# Patient Record
Sex: MALE | Race: NATIVE HAWAIIAN OR OTHER PACIFIC ISLANDER | Employment: FULL TIME | URBAN - METROPOLITAN AREA
[De-identification: names, ages, dates, MRNs, and addresses within clinical notes are randomized per-mention and may not be internally consistent; named-entity substitution may affect disease eponyms.]

---

## 2017-08-28 RX ORDER — LOSARTAN POTASSIUM AND HYDROCHLOROTHIAZIDE 12.5; 5 MG/1; MG/1
1 TABLET ORAL
Qty: 90 TABLET | Refills: 3 | Status: SHIPPED | OUTPATIENT
Start: 2017-08-28 | End: 2018-08-21

## 2017-12-17 ENCOUNTER — OFFICE VISIT (OUTPATIENT)
Dept: FAMILY MEDICINE CLINIC | Facility: CLINIC | Age: 52
End: 2017-12-17

## 2017-12-17 VITALS
TEMPERATURE: 99 F | SYSTOLIC BLOOD PRESSURE: 136 MMHG | OXYGEN SATURATION: 97 % | WEIGHT: 181 LBS | RESPIRATION RATE: 16 BRPM | DIASTOLIC BLOOD PRESSURE: 86 MMHG | HEART RATE: 82 BPM | BODY MASS INDEX: 30 KG/M2

## 2017-12-17 DIAGNOSIS — J20.9 ACUTE BRONCHITIS WITH BRONCHOSPASM: Primary | ICD-10-CM

## 2017-12-17 PROCEDURE — 99213 OFFICE O/P EST LOW 20 MIN: CPT | Performed by: PHYSICIAN ASSISTANT

## 2017-12-17 RX ORDER — AZITHROMYCIN 250 MG/1
TABLET, FILM COATED ORAL
Qty: 6 TABLET | Refills: 0 | Status: SHIPPED | OUTPATIENT
Start: 2017-12-17 | End: 2018-05-15 | Stop reason: ALTCHOICE

## 2017-12-17 RX ORDER — ALBUTEROL SULFATE 90 UG/1
2 AEROSOL, METERED RESPIRATORY (INHALATION) EVERY 4 HOURS PRN
Qty: 1 INHALER | Refills: 0 | Status: SHIPPED | OUTPATIENT
Start: 2017-12-17 | End: 2018-01-16 | Stop reason: WASHOUT

## 2017-12-17 RX ORDER — METHYLPREDNISOLONE 4 MG/1
TABLET ORAL
Qty: 1 PACKAGE | Refills: 0 | Status: SHIPPED | OUTPATIENT
Start: 2017-12-17 | End: 2018-05-15 | Stop reason: ALTCHOICE

## 2017-12-17 NOTE — PROGRESS NOTES
CHIEF COMPLAINT:   Patient presents with:  Cough: wheezing x 4 days, using inhaler    HPI:   Smith Carvajal is a 50year old male who presents for upper and lower respiratory symptoms for  1 weeks, intermittent wheezing x4 days.  Patient reports congestion, cou Smoking status: Current Some Day Smoker                                                    Packs/day: 0.00      Years: 0.00         Types: Cigarettes  Smokeless tobacco: Never Used                      Comment: socially  Alcohol use:  Yes              Comme azithromycin (ZITHROMAX Z-KAYA) 250 MG Oral Tab 6 tablet 0      Sig: Take two tablets by mouth today, then one tablet daily.       Albuterol Sulfate HFA (PROAIR HFA) 108 (90 Base) MCG/ACT Inhalation Aero Soln 1 Inhaler 0      Sig: Inhale 2 puffs into the andrey · You may use over-the-counter medicine to control fever or pain, unless another medicine was prescribed.  Note: If you have chronic liver or kidney disease or have ever had a stomach ulcer or gastrointestinal bleeding, talk with your healthcare provider be · Coughing up increasing amounts of colored sputum  · Weakness, drowsiness, headache, facial pain, ear pain, or a stiff neck  Call 911  Call 911 if any of these occur.   · Coughing up blood  · Worsening weakness, drowsiness, headache, or stiff neck  · Incre

## 2018-04-27 ENCOUNTER — OFFICE VISIT (OUTPATIENT)
Dept: FAMILY MEDICINE CLINIC | Facility: CLINIC | Age: 53
End: 2018-04-27

## 2018-04-27 VITALS
HEIGHT: 65.5 IN | HEART RATE: 92 BPM | DIASTOLIC BLOOD PRESSURE: 74 MMHG | WEIGHT: 182 LBS | BODY MASS INDEX: 29.96 KG/M2 | TEMPERATURE: 99 F | RESPIRATION RATE: 16 BRPM | OXYGEN SATURATION: 98 % | SYSTOLIC BLOOD PRESSURE: 126 MMHG

## 2018-04-27 DIAGNOSIS — J20.9 ACUTE BRONCHITIS, UNSPECIFIED ORGANISM: Primary | ICD-10-CM

## 2018-04-27 PROCEDURE — 99213 OFFICE O/P EST LOW 20 MIN: CPT | Performed by: PHYSICIAN ASSISTANT

## 2018-04-27 RX ORDER — AZITHROMYCIN 250 MG/1
TABLET, FILM COATED ORAL
Qty: 6 TABLET | Refills: 0 | Status: SHIPPED | OUTPATIENT
Start: 2018-04-27 | End: 2018-05-15 | Stop reason: ALTCHOICE

## 2018-04-27 RX ORDER — BENZONATATE 200 MG/1
200 CAPSULE ORAL EVERY 8 HOURS PRN
Qty: 21 CAPSULE | Refills: 0 | Status: SHIPPED | OUTPATIENT
Start: 2018-04-27 | End: 2018-05-04

## 2018-04-27 RX ORDER — ALBUTEROL SULFATE 90 UG/1
AEROSOL, METERED RESPIRATORY (INHALATION)
Qty: 1 INHALER | Refills: 0 | Status: SHIPPED | OUTPATIENT
Start: 2018-04-27 | End: 2018-05-15 | Stop reason: ALTCHOICE

## 2018-04-27 RX ORDER — METHYLPREDNISOLONE 4 MG/1
TABLET ORAL
Qty: 1 PACKAGE | Refills: 0 | Status: SHIPPED | OUTPATIENT
Start: 2018-04-27 | End: 2018-05-15 | Stop reason: ALTCHOICE

## 2018-04-27 NOTE — PROGRESS NOTES
CHIEF COMPLAINT:   Patient presents with:  Cough: wheezy last 3 days, worse today, chest tightness, lots of mucus,sneezing x 2 weeks  hx of allergies        HPI:   Michael Busch is a 48year old male who presents for cough for  3  days. Cough is productive. Past Medical History:   Diagnosis Date   • Acute upper respiratory infections of unspecified site    • Calculus of gallbladder without mention of cholecystitis or obstruction    • Chronic rhinitis    • Extrinsic asthma, unspecified     ALLERGY INDUCED ASTH Meds & Refills for this Visit:  Signed Prescriptions Disp Refills    azithromycin (ZITHROMAX Z-KAYA) 250 MG Oral Tab 6 tablet 0      Sig: Take two tablets by mouth today, then one tablet daily.       methylPREDNISolone 4 MG Oral Tablet Therapy Pack 1 Package · You may use over-the-counter medicines to control fever or pain, unless another medicine was prescribed.  If you have chronic liver or kidney disease or have ever had a stomach ulcer or gastrointestinal bleeding, talk with your healthcare provider before · Trouble breathing, wheezing, or pain with breathing  Date Last Reviewed: 9/13/2015  © 8132-4639 [employer]. [address] [facility] [place], [address] [place] [address]. All rights reserved.  This information is not intended as a substitute for professional m

## 2018-04-27 NOTE — PATIENT INSTRUCTIONS
1. Z saroj  2. Medrol- No NSAIDs  3. Albuterol  4. Tessalon  5. Follow up with PCP  Bronchitis, Antibiotic Treatment (Adult)    Bronchitis is an infection of the air passages (bronchial tubes) in your lungs. It often occurs when you have a cold.  This illne · Finish all antibiotic medicine. Do this even if you are feeling better after only a few days. Follow-up care  Follow up with your healthcare provider, or as advised. If you had an X-ray or ECG (electrocardiogram), a specialist will review it.  You will b

## 2018-05-15 ENCOUNTER — OFFICE VISIT (OUTPATIENT)
Dept: FAMILY MEDICINE CLINIC | Facility: CLINIC | Age: 53
End: 2018-05-15

## 2018-05-15 VITALS
TEMPERATURE: 99 F | SYSTOLIC BLOOD PRESSURE: 138 MMHG | HEIGHT: 65.5 IN | RESPIRATION RATE: 18 BRPM | WEIGHT: 183 LBS | OXYGEN SATURATION: 98 % | BODY MASS INDEX: 30.12 KG/M2 | HEART RATE: 89 BPM | DIASTOLIC BLOOD PRESSURE: 86 MMHG

## 2018-05-15 DIAGNOSIS — J30.1 NON-SEASONAL ALLERGIC RHINITIS DUE TO POLLEN: Primary | ICD-10-CM

## 2018-05-15 DIAGNOSIS — J45.20 INTERMITTENT ASTHMA WITHOUT COMPLICATION, UNSPECIFIED ASTHMA SEVERITY: ICD-10-CM

## 2018-05-15 PROCEDURE — 99213 OFFICE O/P EST LOW 20 MIN: CPT | Performed by: FAMILY MEDICINE

## 2018-05-15 RX ORDER — MONTELUKAST SODIUM 10 MG/1
10 TABLET ORAL DAILY
Qty: 90 TABLET | Refills: 3 | Status: SHIPPED | OUTPATIENT
Start: 2018-05-15 | End: 2019-03-22

## 2018-05-15 RX ORDER — MONTELUKAST SODIUM 10 MG/1
10 TABLET ORAL DAILY
Qty: 30 TABLET | Refills: 0 | Status: SHIPPED | OUTPATIENT
Start: 2018-05-15 | End: 2018-06-14

## 2018-05-15 NOTE — PROGRESS NOTES
Was in the urgent care a few weeks ago with acute bronchitis. Prescribed with antibiotics and inhaler. Doing much better. Needs his Singulair refilled. Does have underlying allergies. Family history is riddled with allergies.     Denies fevers or chill with Dr. Fair Last in August to follow-up on his blood pressure.

## 2018-06-15 ENCOUNTER — PATIENT MESSAGE (OUTPATIENT)
Dept: FAMILY MEDICINE CLINIC | Facility: CLINIC | Age: 53
End: 2018-06-15

## 2018-06-18 RX ORDER — ALBUTEROL SULFATE 90 UG/1
AEROSOL, METERED RESPIRATORY (INHALATION)
Qty: 1 INHALER | Refills: 0 | Status: SHIPPED | OUTPATIENT
Start: 2018-06-18 | End: 2019-02-27

## 2018-06-18 NOTE — TELEPHONE ENCOUNTER
From: Yu Curry  To: Ekaterina Mancuso DO  Sent: 6/15/2018 6:30 PM CDT  Subject: Prescription Question    I am requesting a refill for my Rescue Inhaler (Abuterol Sulfate). I have seemed to misplace my last one.     Thank you

## 2018-06-24 ENCOUNTER — OFFICE VISIT (OUTPATIENT)
Dept: FAMILY MEDICINE CLINIC | Facility: CLINIC | Age: 53
End: 2018-06-24

## 2018-06-24 VITALS
TEMPERATURE: 100 F | DIASTOLIC BLOOD PRESSURE: 80 MMHG | BODY MASS INDEX: 29.46 KG/M2 | HEIGHT: 65.5 IN | OXYGEN SATURATION: 98 % | HEART RATE: 108 BPM | WEIGHT: 179 LBS | RESPIRATION RATE: 16 BRPM | SYSTOLIC BLOOD PRESSURE: 120 MMHG

## 2018-06-24 DIAGNOSIS — J30.9 ALLERGIC RHINITIS, UNSPECIFIED SEASONALITY, UNSPECIFIED TRIGGER: ICD-10-CM

## 2018-06-24 DIAGNOSIS — J45.901 MODERATE ASTHMA WITH ACUTE EXACERBATION, UNSPECIFIED WHETHER PERSISTENT: Primary | ICD-10-CM

## 2018-06-24 PROCEDURE — 99213 OFFICE O/P EST LOW 20 MIN: CPT | Performed by: NURSE PRACTITIONER

## 2018-06-24 RX ORDER — FLUTICASONE PROPIONATE 50 MCG
2 SPRAY, SUSPENSION (ML) NASAL DAILY
Qty: 1 BOTTLE | Refills: 3 | Status: SHIPPED | OUTPATIENT
Start: 2018-06-24 | End: 2019-06-19

## 2018-06-24 RX ORDER — AMOXICILLIN AND CLAVULANATE POTASSIUM 875; 125 MG/1; MG/1
1 TABLET, FILM COATED ORAL 2 TIMES DAILY
Qty: 20 TABLET | Refills: 0 | Status: SHIPPED | OUTPATIENT
Start: 2018-06-24 | End: 2018-07-04

## 2018-06-24 RX ORDER — PREDNISONE 20 MG/1
20 TABLET ORAL DAILY
Qty: 5 TABLET | Refills: 0 | Status: SHIPPED | OUTPATIENT
Start: 2018-06-24 | End: 2018-06-29

## 2018-06-24 NOTE — PROGRESS NOTES
CHIEF COMPLAINT:   Patient presents with:  Cough: x3 weeks, wheezing  Body ache and/or chills      HPI:   Alosno Cutler is a 48year old male who presents for upper respiratory symptoms for  3 weeks.  Patient reports congestion, dry cough, wheezing, body aches Smokeless tobacco: Never Used                      Comment: socially  Alcohol use:  Yes              Comment: 3 TIMES PER YEAR        REVIEW OF SYSTEMS:   GENERAL: good appetite  SKIN: no rashes or abnormal skin lesions  HEENT: See HPI  LUNGS: See HPI  CARD Risks, benefits, and side effects of medication explained and discussed. The patient indicates understanding of these issues and agrees to the plan. The patient is asked to f/u with PCP if sx's persist or worsen.   Patient Instructions     Patient advis Allergies can cause nasal tissue to swell. This makes the air passages smaller. The nose may feel stuffed up. The nose may also make extra mucus, which can plug the nasal passages or drip out of the nose.  Mucus can drip down the back of the throat (postnas

## 2018-06-24 NOTE — PATIENT INSTRUCTIONS
Patient advised to start antihistamine (Claritin/Zyrtec/Allegra) daily along with nasal steroid spray daily for one month and then stop. IF the sinus congestion returns to restart the allergy medications for the entire growing season.    Use the prednisone · Plugged-up ears (ear congestion)  · Sore throat  · Coughing  · Sinus pain and swelling  · Headache  It may not be allergies  Other health problems can cause symptoms like those of nasal allergies.  These include:  · Nonallergic rhinitis and viruses such a

## 2018-08-03 ENCOUNTER — OFFICE VISIT (OUTPATIENT)
Dept: FAMILY MEDICINE CLINIC | Facility: CLINIC | Age: 53
End: 2018-08-03
Payer: COMMERCIAL

## 2018-08-03 VITALS
TEMPERATURE: 98 F | OXYGEN SATURATION: 98 % | BODY MASS INDEX: 29.09 KG/M2 | WEIGHT: 181 LBS | DIASTOLIC BLOOD PRESSURE: 80 MMHG | RESPIRATION RATE: 18 BRPM | HEIGHT: 66 IN | SYSTOLIC BLOOD PRESSURE: 120 MMHG | HEART RATE: 84 BPM

## 2018-08-03 DIAGNOSIS — J45.901 ASTHMATIC BRONCHITIS WITH ACUTE EXACERBATION, UNSPECIFIED ASTHMA SEVERITY, UNSPECIFIED WHETHER PERSISTENT: Primary | ICD-10-CM

## 2018-08-03 PROCEDURE — 94640 AIRWAY INHALATION TREATMENT: CPT | Performed by: PHYSICIAN ASSISTANT

## 2018-08-03 PROCEDURE — 99213 OFFICE O/P EST LOW 20 MIN: CPT | Performed by: PHYSICIAN ASSISTANT

## 2018-08-03 RX ORDER — ALBUTEROL SULFATE 2.5 MG/3ML
2.5 SOLUTION RESPIRATORY (INHALATION) ONCE
Status: COMPLETED | OUTPATIENT
Start: 2018-08-03 | End: 2018-08-03

## 2018-08-03 RX ORDER — METHYLPREDNISOLONE 4 MG/1
TABLET ORAL
Qty: 1 PACKAGE | Refills: 0 | Status: SHIPPED | OUTPATIENT
Start: 2018-08-03 | End: 2018-08-21 | Stop reason: ALTCHOICE

## 2018-08-03 RX ORDER — AZITHROMYCIN 250 MG/1
TABLET, FILM COATED ORAL
Qty: 6 TABLET | Refills: 0 | Status: SHIPPED | OUTPATIENT
Start: 2018-08-03 | End: 2018-08-21 | Stop reason: ALTCHOICE

## 2018-08-03 RX ADMIN — ALBUTEROL SULFATE 2.5 MG: 2.5 SOLUTION RESPIRATORY (INHALATION) at 19:25:00

## 2018-08-03 NOTE — PROGRESS NOTES
CHIEF COMPLAINT:   Patient presents with:  Cough: Producive,wheezing. 3 days. HPI:   Yury Norton is a 48year old male who presents for cough for 3 days. Cough is productive.  Associated symptoms include: chest tightness, SOB, wheezing, post nasal dri Smoking status: Current Some Day Smoker                                                    Packs/day: 0.00      Years: 0.00         Types: Cigarettes  Smokeless tobacco: Never Used                      Comment: socially  Alcohol use:  Yes              Comme methylPREDNISolone 4 MG Oral Tablet Therapy Pack 1 Package 0      Sig: Take as directed on package instructions      azithromycin (ZITHROMAX Z-KAYA) 250 MG Oral Tab 6 tablet 0      Sig: Take two tablets by mouth today, then one tablet daily.            Side A physical exam, health history, and certain tests help your healthcare provider make the diagnosis. Health history  Your healthcare provider will ask you about your symptoms. The exam  Your provider listens Linard Heading chest for signs of congestion.  He or s Most cases of bronchitis are caused by cold or flu viruses. They don’t need antibiotics to treat them, even if your mucus is thick and green or yellow.  Antibiotics don’t treat viral illness and antibiotics have not been shown to have any benefit in cases o © 5070-7822 [employer]. [address] [facility] [place], [address] [place]. All rights reserved. This information is not intended as a substitute for professional medical care. Always follow your healthcare professional's instructions.             The

## 2018-08-03 NOTE — PATIENT INSTRUCTIONS
1. Medrol- No NSAIDs  2. Albuterol  3. Continue allergy medications  4. If fever start Z saroj  5. Follow up with PCP  6. Seek treatment if worsening symptoms     What Is Acute Bronchitis?   Acute bronchitis is when the airways in your lungs (bronchial tube · A sputum test for bacteria. This requires a sample of mucus from your lungs. · A nasal or throat swab. This tests to see if you have a bacterial infection. · A chest X-ray. This is done if your healthcare provider thinks you have pneumonia.   · Tests to · Take all of the medicine. Take the medicine until it is used up, even if symptoms have improved. If you don’t, the bronchitis may come back. · Take the medicines as directed. For instance, some medicines should be taken with food.   · Ask about side effe

## 2018-08-21 ENCOUNTER — OFFICE VISIT (OUTPATIENT)
Dept: FAMILY MEDICINE CLINIC | Facility: CLINIC | Age: 53
End: 2018-08-21
Payer: COMMERCIAL

## 2018-08-21 VITALS
DIASTOLIC BLOOD PRESSURE: 86 MMHG | WEIGHT: 184 LBS | HEIGHT: 66 IN | TEMPERATURE: 99 F | OXYGEN SATURATION: 98 % | BODY MASS INDEX: 29.57 KG/M2 | RESPIRATION RATE: 16 BRPM | SYSTOLIC BLOOD PRESSURE: 124 MMHG | HEART RATE: 76 BPM

## 2018-08-21 DIAGNOSIS — I10 ESSENTIAL HYPERTENSION: ICD-10-CM

## 2018-08-21 DIAGNOSIS — Z00.00 ROUTINE GENERAL MEDICAL EXAMINATION AT A HEALTH CARE FACILITY: ICD-10-CM

## 2018-08-21 DIAGNOSIS — E55.9 HYPOVITAMINOSIS D: ICD-10-CM

## 2018-08-21 DIAGNOSIS — Z23 NEED FOR PNEUMOCOCCAL VACCINATION: ICD-10-CM

## 2018-08-21 DIAGNOSIS — Z00.00 LABORATORY EXAMINATION ORDERED AS PART OF A ROUTINE GENERAL MEDICAL EXAMINATION: Primary | ICD-10-CM

## 2018-08-21 DIAGNOSIS — K75.81 STEATOHEPATITIS, NON-ALCOHOLIC: ICD-10-CM

## 2018-08-21 DIAGNOSIS — Z11.59 ENCOUNTER FOR HEPATITIS C SCREENING TEST FOR LOW RISK PATIENT: ICD-10-CM

## 2018-08-21 DIAGNOSIS — Z12.11 SCREENING FOR MALIGNANT NEOPLASM OF COLON: ICD-10-CM

## 2018-08-21 DIAGNOSIS — E78.00 PURE HYPERCHOLESTEROLEMIA: ICD-10-CM

## 2018-08-21 DIAGNOSIS — Z12.5 ENCOUNTER FOR SCREENING FOR MALIGNANT NEOPLASM OF PROSTATE: ICD-10-CM

## 2018-08-21 PROCEDURE — 90670 PCV13 VACCINE IM: CPT | Performed by: FAMILY MEDICINE

## 2018-08-21 PROCEDURE — 99213 OFFICE O/P EST LOW 20 MIN: CPT | Performed by: FAMILY MEDICINE

## 2018-08-21 PROCEDURE — 90471 IMMUNIZATION ADMIN: CPT | Performed by: FAMILY MEDICINE

## 2018-08-21 RX ORDER — LOSARTAN POTASSIUM AND HYDROCHLOROTHIAZIDE 12.5; 5 MG/1; MG/1
1 TABLET ORAL
Qty: 90 TABLET | Refills: 3 | Status: SHIPPED | OUTPATIENT
Start: 2018-08-21 | End: 2019-08-10

## 2018-08-21 NOTE — PROGRESS NOTES
Patient presents for a physical.  He is a known diabetic. All medications were reviewed these include, in addition to his inhalers, losartan for blood pressure. He is due for a Prevnar vaccine which will be given today.     Exam vital signs normal

## 2018-08-21 NOTE — PROGRESS NOTES
My suggestion to discuss his recent blood work. Though he is essentially asymptomatic his vitamin D level was low and he is subsequently taking 2000 units of D3 supplement per day.     His fasting blood sugar returned at 122 without symptoms of polyuri

## 2018-08-30 LAB
ABSOLUTE BASOPHILS: 72 CELLS/UL (ref 0–200)
ABSOLUTE EOSINOPHILS: 302 CELLS/UL (ref 15–500)
ABSOLUTE LYMPHOCYTES: 2707 CELLS/UL (ref 850–3900)
ABSOLUTE MONOCYTES: 454 CELLS/UL (ref 200–950)
ABSOLUTE NEUTROPHILS: 3665 CELLS/UL (ref 1500–7800)
ALBUMIN/GLOBULIN RATIO: 1.2 (CALC) (ref 1–2.5)
ALBUMIN: 4 G/DL (ref 3.6–5.1)
ALKALINE PHOSPHATASE: 75 U/L (ref 40–115)
ALT: 31 U/L (ref 9–46)
AST: 17 U/L (ref 10–35)
BASOPHILS: 1 %
BILIRUBIN, TOTAL: 0.4 MG/DL (ref 0.2–1.2)
BUN: 20 MG/DL (ref 7–25)
CALCIUM: 9.4 MG/DL (ref 8.6–10.3)
CARBON DIOXIDE: 27 MMOL/L (ref 20–32)
CHLORIDE: 104 MMOL/L (ref 98–110)
CHOL/HDLC RATIO: 7.4 (CALC)
CHOLESTEROL, TOTAL: 223 MG/DL
CREATININE: 0.92 MG/DL (ref 0.7–1.33)
EGFR IF AFRICN AM: 111 ML/MIN/1.73M2
EGFR IF NONAFRICN AM: 96 ML/MIN/1.73M2
EOSINOPHILS: 4.2 %
GLOBULIN: 3.3 G/DL (CALC) (ref 1.9–3.7)
GLUCOSE: 91 MG/DL (ref 65–99)
HDL CHOLESTEROL: 30 MG/DL
HEMATOCRIT: 44.8 % (ref 38.5–50)
HEMOGLOBIN: 15.7 G/DL (ref 13.2–17.1)
LDL-CHOLESTEROL: 155 MG/DL (CALC)
LYMPHOCYTES: 37.6 %
MCH: 31 PG (ref 27–33)
MCHC: 35 G/DL (ref 32–36)
MCV: 88.5 FL (ref 80–100)
MONOCYTES: 6.3 %
MPV: 9.6 FL (ref 7.5–12.5)
NEUTROPHILS: 50.9 %
NON-HDL CHOLESTEROL: 193 MG/DL (CALC)
PLATELET COUNT: 362 THOUSAND/UL (ref 140–400)
POTASSIUM: 4.2 MMOL/L (ref 3.5–5.3)
PROTEIN, TOTAL: 7.3 G/DL (ref 6.1–8.1)
RDW: 12.6 % (ref 11–15)
RED BLOOD CELL COUNT: 5.06 MILLION/UL (ref 4.2–5.8)
SIGNAL TO CUT-OFF: 0.02
SODIUM: 138 MMOL/L (ref 135–146)
TRIGLYCERIDES: 224 MG/DL
TSH: 2.61 MIU/L (ref 0.4–4.5)
VITAMIN D, 25-OH, TOTAL: 20 NG/ML (ref 30–100)
WHITE BLOOD CELL COUNT: 7.2 THOUSAND/UL (ref 3.8–10.8)

## 2018-10-23 ENCOUNTER — IMMUNIZATION (OUTPATIENT)
Dept: FAMILY MEDICINE CLINIC | Facility: CLINIC | Age: 53
End: 2018-10-23
Payer: COMMERCIAL

## 2018-10-23 DIAGNOSIS — Z23 NEED FOR VACCINATION: ICD-10-CM

## 2018-10-23 PROCEDURE — 90686 IIV4 VACC NO PRSV 0.5 ML IM: CPT | Performed by: FAMILY MEDICINE

## 2018-10-23 PROCEDURE — 90471 IMMUNIZATION ADMIN: CPT | Performed by: FAMILY MEDICINE

## 2019-02-27 RX ORDER — ALBUTEROL SULFATE 90 UG/1
AEROSOL, METERED RESPIRATORY (INHALATION)
Qty: 1 INHALER | Refills: 0 | Status: SHIPPED | OUTPATIENT
Start: 2019-02-27 | End: 2019-07-13

## 2019-03-22 DIAGNOSIS — J45.20 INTERMITTENT ASTHMA WITHOUT COMPLICATION, UNSPECIFIED ASTHMA SEVERITY: ICD-10-CM

## 2019-03-22 DIAGNOSIS — J30.1 NON-SEASONAL ALLERGIC RHINITIS DUE TO POLLEN: ICD-10-CM

## 2019-03-23 RX ORDER — MONTELUKAST SODIUM 10 MG/1
TABLET ORAL
Qty: 90 TABLET | Refills: 3 | Status: SHIPPED | OUTPATIENT
Start: 2019-03-23 | End: 2020-01-28

## 2019-05-16 ENCOUNTER — OFFICE VISIT (OUTPATIENT)
Dept: FAMILY MEDICINE CLINIC | Facility: CLINIC | Age: 54
End: 2019-05-16
Payer: COMMERCIAL

## 2019-05-16 VITALS
HEIGHT: 66 IN | WEIGHT: 183 LBS | HEART RATE: 70 BPM | OXYGEN SATURATION: 98 % | BODY MASS INDEX: 29.41 KG/M2 | DIASTOLIC BLOOD PRESSURE: 86 MMHG | TEMPERATURE: 98 F | RESPIRATION RATE: 16 BRPM | SYSTOLIC BLOOD PRESSURE: 132 MMHG

## 2019-05-16 DIAGNOSIS — H00.014 HORDEOLUM EXTERNUM OF LEFT UPPER EYELID: Primary | ICD-10-CM

## 2019-05-16 PROCEDURE — 99213 OFFICE O/P EST LOW 20 MIN: CPT | Performed by: PHYSICIAN ASSISTANT

## 2019-05-16 RX ORDER — ERYTHROMYCIN 5 MG/G
OINTMENT OPHTHALMIC
Qty: 3.5 G | Refills: 0 | Status: SHIPPED | OUTPATIENT
Start: 2019-05-16 | End: 2019-09-04 | Stop reason: ALTCHOICE

## 2019-05-16 NOTE — PATIENT INSTRUCTIONS
1. Erythromycin  2. Continue warm compresses  3. Follow up with ophthalmology if no improvement  4. No contacts until symptoms resolve    Sty (or Stye)  A sty is an infection of the oil gland of the eyelid.  It may develop into a small pocket of pus (an a · The sty comes back  Date Last Reviewed: 8/1/2017  © 4381-4209 [employer]. [address] [facility] [place], [facility] [place]. All rights reserved. This information is not intended as a substitute for professional medical care.  Always follow your he

## 2019-05-16 NOTE — PROGRESS NOTES
CHIEF COMPLAINT:   Patient presents with:  Eye Problem: upper lid swelling, clear tearing last few days, had stye a few weeks ago      HPI:   Marianne Awan is a 48year old male who presents with chief complaint of left upper eye lid swelling for two weeks. • Hx of endoscopy 08/17/2017   • Lipid screening 12/1/11      Past Surgical History:   Procedure Laterality Date   • ANESTH,ACHILLES TENDON SURG  1/1/01    right   • CHOLECYSTECTOMY        Family History   Problem Relation Age of Onset   • Allergies Father PLAN: Hygeine and comfort care as listen in patient instructions.   Medication as listed below     Requested Prescriptions     Signed Prescriptions Disp Refills   • erythromycin 5 MG/GM Ophthalmic Ointment 3.5 g 0     Sig: Apply thin layer the the left eye · Don’t squeeze or try to break open the sty.   Follow-up care  Follow up with your healthcare provider, or as advised.   When to seek medical advice  Call your healthcare provider right away if any of these occur:  · Increase in swelling or redness around

## 2019-07-15 RX ORDER — ALBUTEROL SULFATE 90 UG/1
AEROSOL, METERED RESPIRATORY (INHALATION)
Qty: 1 INHALER | Refills: 0 | Status: SHIPPED | OUTPATIENT
Start: 2019-07-15 | End: 2019-12-27

## 2019-08-12 RX ORDER — LOSARTAN POTASSIUM AND HYDROCHLOROTHIAZIDE 12.5; 5 MG/1; MG/1
1 TABLET ORAL
Qty: 30 TABLET | Refills: 0 | Status: SHIPPED | OUTPATIENT
Start: 2019-08-12 | End: 2019-09-04

## 2019-08-30 RX ORDER — LOSARTAN POTASSIUM AND HYDROCHLOROTHIAZIDE 12.5; 5 MG/1; MG/1
1 TABLET ORAL
Qty: 90 TABLET | Refills: 3 | Status: SHIPPED | OUTPATIENT
Start: 2019-08-30 | End: 2019-09-04 | Stop reason: ALTCHOICE

## 2019-09-04 ENCOUNTER — OFFICE VISIT (OUTPATIENT)
Dept: FAMILY MEDICINE CLINIC | Facility: CLINIC | Age: 54
End: 2019-09-04
Payer: COMMERCIAL

## 2019-09-04 VITALS
WEIGHT: 184 LBS | HEART RATE: 76 BPM | BODY MASS INDEX: 29.57 KG/M2 | OXYGEN SATURATION: 98 % | HEIGHT: 66 IN | DIASTOLIC BLOOD PRESSURE: 88 MMHG | SYSTOLIC BLOOD PRESSURE: 130 MMHG | TEMPERATURE: 99 F | RESPIRATION RATE: 18 BRPM

## 2019-09-04 DIAGNOSIS — E78.00 PURE HYPERCHOLESTEROLEMIA: ICD-10-CM

## 2019-09-04 DIAGNOSIS — Z00.00 ROUTINE GENERAL MEDICAL EXAMINATION AT A HEALTH CARE FACILITY: Primary | ICD-10-CM

## 2019-09-04 DIAGNOSIS — I10 ESSENTIAL HYPERTENSION: ICD-10-CM

## 2019-09-04 DIAGNOSIS — Z13.1 SCREENING FOR DIABETES MELLITUS: ICD-10-CM

## 2019-09-04 PROCEDURE — 99396 PREV VISIT EST AGE 40-64: CPT | Performed by: FAMILY MEDICINE

## 2019-09-04 RX ORDER — LOSARTAN POTASSIUM AND HYDROCHLOROTHIAZIDE 12.5; 5 MG/1; MG/1
1 TABLET ORAL
Qty: 90 TABLET | Refills: 3 | COMMUNITY
Start: 2019-08-30 | End: 2019-09-11

## 2019-09-04 NOTE — PROGRESS NOTES
HPI:  Here for a physical.    PAST MEDICAL HISTORY:  Past Medical History:   Diagnosis Date   • Acute upper respiratory infections of unspecified site    • Calculus of gallbladder without mention of cholecystitis or obstruction    • Chronic rhinitis    • E acuity. CARDIOVASCULAR: No complaints of chest pain with exertion, no PND, orthopnea, or edema. No decrease in exercise tolerance. PULMONARY: No complaints of extreme shortness of breath with activity. No complaints of  wheezing.  No complaints of  hemopt auscultated. Bilateral dorsalis pedis and posterior tibial pulses 2+/4+. No edema of the bilateral lower extremities. No JVD noted. PULMONARY: CTA bilaterally, good air exchange, no rhonchi, wheezes, or rales. No dullness to percussion.   ABDOMEN: Soft, no fasting labs. Patient understood above plan and agreed to do labs within the next 30 days as well as to make all appointments for referrals if given within the next 30 days. Patient understands to contact office if unable to do so.

## 2019-09-06 ENCOUNTER — APPOINTMENT (OUTPATIENT)
Dept: LAB | Age: 54
End: 2019-09-06
Attending: FAMILY MEDICINE
Payer: COMMERCIAL

## 2019-09-06 DIAGNOSIS — Z00.00 ROUTINE GENERAL MEDICAL EXAMINATION AT A HEALTH CARE FACILITY: ICD-10-CM

## 2019-09-06 LAB
ALBUMIN SERPL-MCNC: 3.5 G/DL (ref 3.4–5)
ALBUMIN/GLOB SERPL: 0.9 {RATIO} (ref 1–2)
ALP LIVER SERPL-CCNC: 74 U/L (ref 45–117)
ALT SERPL-CCNC: 56 U/L (ref 16–61)
ANION GAP SERPL CALC-SCNC: 8 MMOL/L (ref 0–18)
AST SERPL-CCNC: 27 U/L (ref 15–37)
BILIRUB SERPL-MCNC: 0.4 MG/DL (ref 0.1–2)
BUN BLD-MCNC: 15 MG/DL (ref 7–18)
BUN/CREAT SERPL: 13.3 (ref 10–20)
CALCIUM BLD-MCNC: 9.2 MG/DL (ref 8.5–10.1)
CHLORIDE SERPL-SCNC: 105 MMOL/L (ref 98–112)
CHOLEST SMN-MCNC: 200 MG/DL (ref ?–200)
CO2 SERPL-SCNC: 27 MMOL/L (ref 21–32)
COMPLEXED PSA SERPL-MCNC: 1.05 NG/ML (ref ?–4)
CREAT BLD-MCNC: 1.13 MG/DL (ref 0.7–1.3)
EST. AVERAGE GLUCOSE BLD GHB EST-MCNC: 123 MG/DL (ref 68–126)
GLOBULIN PLAS-MCNC: 3.7 G/DL (ref 2.8–4.4)
GLUCOSE BLD-MCNC: 94 MG/DL (ref 70–99)
HBA1C MFR BLD HPLC: 5.9 % (ref ?–5.7)
HDLC SERPL-MCNC: 33 MG/DL (ref 40–59)
LDLC SERPL CALC-MCNC: 128 MG/DL (ref ?–100)
M PROTEIN MFR SERPL ELPH: 7.2 G/DL (ref 6.4–8.2)
NONHDLC SERPL-MCNC: 167 MG/DL (ref ?–130)
OSMOLALITY SERPL CALC.SUM OF ELEC: 291 MOSM/KG (ref 275–295)
POTASSIUM SERPL-SCNC: 3.6 MMOL/L (ref 3.5–5.1)
SODIUM SERPL-SCNC: 140 MMOL/L (ref 136–145)
TRIGL SERPL-MCNC: 197 MG/DL (ref 30–149)
VLDLC SERPL CALC-MCNC: 39 MG/DL (ref 0–30)

## 2019-09-06 PROCEDURE — 83036 HEMOGLOBIN GLYCOSYLATED A1C: CPT | Performed by: FAMILY MEDICINE

## 2019-09-06 PROCEDURE — 80053 COMPREHEN METABOLIC PANEL: CPT | Performed by: FAMILY MEDICINE

## 2019-09-06 PROCEDURE — 84153 ASSAY OF PSA TOTAL: CPT | Performed by: FAMILY MEDICINE

## 2019-09-06 PROCEDURE — 36415 COLL VENOUS BLD VENIPUNCTURE: CPT | Performed by: FAMILY MEDICINE

## 2019-09-06 PROCEDURE — 80061 LIPID PANEL: CPT | Performed by: FAMILY MEDICINE

## 2019-09-11 RX ORDER — LOSARTAN POTASSIUM AND HYDROCHLOROTHIAZIDE 12.5; 5 MG/1; MG/1
1 TABLET ORAL DAILY
Qty: 30 TABLET | Refills: 0 | Status: SHIPPED | OUTPATIENT
Start: 2019-09-11 | End: 2019-09-12 | Stop reason: RX

## 2019-09-11 NOTE — TELEPHONE ENCOUNTER
Patient needs 30 day refill of Losartan sent to the . Nataliia Larsen 26 on record. States his mail order is out of stock and told him they had reached out to us for a substitute. Please advise.

## 2019-09-12 ENCOUNTER — TELEPHONE (OUTPATIENT)
Dept: FAMILY MEDICINE CLINIC | Facility: CLINIC | Age: 54
End: 2019-09-12

## 2019-09-12 RX ORDER — LOSARTAN POTASSIUM 50 MG/1
50 TABLET ORAL DAILY
Qty: 30 TABLET | Refills: 0 | Status: SHIPPED | OUTPATIENT
Start: 2019-09-12 | End: 2019-09-16

## 2019-09-12 RX ORDER — HYDROCHLOROTHIAZIDE 12.5 MG/1
12.5 CAPSULE, GELATIN COATED ORAL DAILY
Qty: 30 CAPSULE | Refills: 0 | Status: SHIPPED | OUTPATIENT
Start: 2019-09-12 | End: 2019-09-16

## 2019-09-12 NOTE — TELEPHONE ENCOUNTER
Pt told by pharmacy his losartan OOS and they want to split 2 different doses - pls call pharmacy to set up - pt has only 1 day left

## 2019-09-16 RX ORDER — LOSARTAN POTASSIUM 50 MG/1
50 TABLET ORAL DAILY
Qty: 90 TABLET | Refills: 1 | Status: SHIPPED | OUTPATIENT
Start: 2019-09-16 | End: 2019-10-15 | Stop reason: RX

## 2019-09-16 RX ORDER — HYDROCHLOROTHIAZIDE 12.5 MG/1
12.5 CAPSULE, GELATIN COATED ORAL DAILY
Qty: 90 CAPSULE | Refills: 1 | Status: SHIPPED | OUTPATIENT
Start: 2019-09-16 | End: 2020-02-19

## 2019-09-16 NOTE — TELEPHONE ENCOUNTER
Last VISIT 9/4/19  Last REFILL Losartan 9/12/19 qty 30 w/0 refill to [facility]     Hydrochlorothiazide 9/12/19 qty 30 w/0 refills to [facility]       Pt changed to [facility], asking for next refills be sent there

## 2019-10-15 ENCOUNTER — TELEPHONE (OUTPATIENT)
Dept: FAMILY MEDICINE CLINIC | Facility: CLINIC | Age: 54
End: 2019-10-15

## 2019-10-15 RX ORDER — LOSARTAN POTASSIUM 100 MG/1
TABLET ORAL
Qty: 45 TABLET | Refills: 1 | Status: SHIPPED | OUTPATIENT
Start: 2019-10-15 | End: 2020-04-16

## 2019-10-15 NOTE — TELEPHONE ENCOUNTER
[facility] mail order out of losartan Potassium 50mg but the have the 100mg tab that he can cut. He only has 3 days left and he is going out of town next week.     Please send to [facility]

## 2019-12-27 RX ORDER — ALBUTEROL SULFATE 90 UG/1
AEROSOL, METERED RESPIRATORY (INHALATION)
Qty: 1 INHALER | Refills: 0 | Status: SHIPPED | OUTPATIENT
Start: 2019-12-27 | End: 2020-03-18

## 2019-12-28 ENCOUNTER — OFFICE VISIT (OUTPATIENT)
Dept: FAMILY MEDICINE CLINIC | Facility: CLINIC | Age: 54
End: 2019-12-28
Payer: COMMERCIAL

## 2019-12-28 VITALS
DIASTOLIC BLOOD PRESSURE: 98 MMHG | SYSTOLIC BLOOD PRESSURE: 128 MMHG | RESPIRATION RATE: 16 BRPM | HEIGHT: 67 IN | TEMPERATURE: 99 F | BODY MASS INDEX: 28.04 KG/M2 | OXYGEN SATURATION: 97 % | HEART RATE: 78 BPM | WEIGHT: 178.63 LBS

## 2019-12-28 DIAGNOSIS — J45.21 INTERMITTENT ASTHMA WITH ACUTE EXACERBATION, UNSPECIFIED ASTHMA SEVERITY: Primary | ICD-10-CM

## 2019-12-28 DIAGNOSIS — J01.00 ACUTE MAXILLARY SINUSITIS, RECURRENCE NOT SPECIFIED: ICD-10-CM

## 2019-12-28 PROCEDURE — 99213 OFFICE O/P EST LOW 20 MIN: CPT | Performed by: NURSE PRACTITIONER

## 2019-12-28 RX ORDER — METHYLPREDNISOLONE 4 MG/1
TABLET ORAL
Qty: 1 KIT | Refills: 0 | Status: SHIPPED | OUTPATIENT
Start: 2019-12-28 | End: 2020-08-06 | Stop reason: ALTCHOICE

## 2019-12-28 RX ORDER — AMOXICILLIN 875 MG/1
875 TABLET, COATED ORAL 2 TIMES DAILY
Qty: 20 TABLET | Refills: 0 | Status: SHIPPED | OUTPATIENT
Start: 2019-12-28 | End: 2020-01-07

## 2019-12-28 NOTE — PROGRESS NOTES
CHIEF COMPLAINT:   Patient presents with:  Cough: Productive cough X 3 days, wheezing. shortness or breath, sinus congestion, chest congestion. Patient denies any NV, fever or chils.       HPI:   Sea Velazquez is a 48year old male who presents for upper respi SKIN: no rashes or abnormal skin lesions  HEENT: See HPI  LUNGS: See HPI  CARDIOVASCULAR: denies chest pain or palpitations   GI: denies N/V/C or abdominal pain      EXAM:   BP (!) 128/98 (BP Location: Right arm, Patient Position: Sitting, Cuff Size: adult The patient is asked to f/u with PCP if sx's persist or worsen. Patient Instructions     Asthma (Adult)  Asthma is a disease where the medium and  small air passages within the lung go into spasm and restrict the flow of air.  Inflammation and swelling o Follow up with your healthcare provider, or as advised. Always bring all of your current medicines to any appointments with your healthcare provider. Also bring a complete list of medicines even those not taken for asthma.  If you don't already have one, ta

## 2020-01-26 ENCOUNTER — OFFICE VISIT (OUTPATIENT)
Dept: FAMILY MEDICINE CLINIC | Facility: CLINIC | Age: 55
End: 2020-01-26
Payer: COMMERCIAL

## 2020-01-26 VITALS
WEIGHT: 179 LBS | HEIGHT: 67 IN | BODY MASS INDEX: 28.09 KG/M2 | RESPIRATION RATE: 18 BRPM | HEART RATE: 103 BPM | DIASTOLIC BLOOD PRESSURE: 76 MMHG | TEMPERATURE: 99 F | OXYGEN SATURATION: 96 % | SYSTOLIC BLOOD PRESSURE: 138 MMHG

## 2020-01-26 DIAGNOSIS — J45.21 INTERMITTENT ASTHMA WITH ACUTE EXACERBATION, UNSPECIFIED ASTHMA SEVERITY: ICD-10-CM

## 2020-01-26 DIAGNOSIS — F17.200 TOBACCO USE DISORDER: ICD-10-CM

## 2020-01-26 DIAGNOSIS — J10.1 INFLUENZA A: Primary | ICD-10-CM

## 2020-01-26 LAB
POCT INFLUENZA A: POSITIVE
POCT INFLUENZA B: NEGATIVE

## 2020-01-26 PROCEDURE — 87502 INFLUENZA DNA AMP PROBE: CPT | Performed by: NURSE PRACTITIONER

## 2020-01-26 PROCEDURE — 99214 OFFICE O/P EST MOD 30 MIN: CPT | Performed by: NURSE PRACTITIONER

## 2020-01-26 RX ORDER — OSELTAMIVIR PHOSPHATE 75 MG/1
75 CAPSULE ORAL 2 TIMES DAILY
Qty: 10 CAPSULE | Refills: 0 | Status: SHIPPED | OUTPATIENT
Start: 2020-01-26 | End: 2020-01-31

## 2020-01-26 RX ORDER — PREDNISONE 20 MG/1
60 TABLET ORAL DAILY
Qty: 15 TABLET | Refills: 0 | Status: SHIPPED | OUTPATIENT
Start: 2020-01-26 | End: 2020-01-31

## 2020-01-26 NOTE — PROGRESS NOTES
CHIEF COMPLAINT:   Patient presents with:  Cough: cough, wheezing, tightness in chest, sob ( 4 day)       HPI:   Marianne Awan is a 48year old male who presents for upper respiratory symptoms for  2 days.  Patient reports congestion, cough with unknown colore Tobacco Use      Smoking status: Current Some Day Smoker        Types: Cigarettes      Smokeless tobacco: Never Used      Tobacco comment: socially    Alcohol use: Yes      Comment: 3 TIMES PER YEAR    Drug use: No        REVIEW OF SYSTEMS:   GENERAL: rajat Signed Prescriptions Disp Refills   • predniSONE 20 MG Oral Tab 15 tablet 0     Sig: Take 3 tablets (60 mg total) by mouth daily for 5 days.    • Oseltamivir Phosphate 75 MG Oral Cap 10 capsule 0     Sig: Take 1 capsule (75 mg total) by mouth 2 (two) times · Over-the-counter cold medicines will not make the flu go away faster. But the medicines may help with coughing, sore throat, and congestion in your nose and sinuses. Don’t use a decongestant if you have high blood pressure.   · Stay home until your fever

## 2020-01-27 DIAGNOSIS — J30.1 NON-SEASONAL ALLERGIC RHINITIS DUE TO POLLEN: ICD-10-CM

## 2020-01-27 DIAGNOSIS — J45.20 INTERMITTENT ASTHMA WITHOUT COMPLICATION, UNSPECIFIED ASTHMA SEVERITY: ICD-10-CM

## 2020-01-28 RX ORDER — MONTELUKAST SODIUM 10 MG/1
TABLET ORAL
Qty: 90 TABLET | Refills: 3 | Status: SHIPPED | OUTPATIENT
Start: 2020-01-28 | End: 2020-12-31

## 2020-02-19 RX ORDER — HYDROCHLOROTHIAZIDE 12.5 MG/1
CAPSULE, GELATIN COATED ORAL
Qty: 90 CAPSULE | Refills: 1 | Status: SHIPPED | OUTPATIENT
Start: 2020-02-19 | End: 2020-07-20

## 2020-03-18 RX ORDER — ALBUTEROL SULFATE 90 UG/1
AEROSOL, METERED RESPIRATORY (INHALATION)
Qty: 1 INHALER | Refills: 0 | Status: SHIPPED | OUTPATIENT
Start: 2020-03-18 | End: 2020-09-16

## 2020-04-16 RX ORDER — LOSARTAN POTASSIUM 100 MG/1
TABLET ORAL
Qty: 45 TABLET | Refills: 0 | Status: SHIPPED | OUTPATIENT
Start: 2020-04-16 | End: 2020-07-20

## 2020-07-20 ENCOUNTER — PATIENT MESSAGE (OUTPATIENT)
Dept: FAMILY MEDICINE CLINIC | Facility: CLINIC | Age: 55
End: 2020-07-20

## 2020-07-20 RX ORDER — LOSARTAN POTASSIUM 100 MG/1
TABLET ORAL
Qty: 45 TABLET | Refills: 0 | OUTPATIENT
Start: 2020-07-20

## 2020-07-20 RX ORDER — LOSARTAN POTASSIUM 100 MG/1
TABLET ORAL
Qty: 45 TABLET | Refills: 0 | Status: SHIPPED | OUTPATIENT
Start: 2020-07-20 | End: 2020-08-06

## 2020-07-20 RX ORDER — HYDROCHLOROTHIAZIDE 12.5 MG/1
CAPSULE, GELATIN COATED ORAL
Qty: 90 CAPSULE | Refills: 1 | Status: SHIPPED | OUTPATIENT
Start: 2020-07-20 | End: 2020-12-31

## 2020-07-20 RX ORDER — LOSARTAN POTASSIUM 100 MG/1
TABLET ORAL
Qty: 45 TABLET | Refills: 0 | Status: SHIPPED | OUTPATIENT
Start: 2020-07-20 | End: 2020-08-06 | Stop reason: ALTCHOICE

## 2020-07-20 NOTE — TELEPHONE ENCOUNTER
Last ov 9/4/2019  Last refill 4/16/2020      . A messsage has been set via [employer] for Pt to schedule a f/u on BP

## 2020-07-20 NOTE — TELEPHONE ENCOUNTER
From: Scar Perales Ico  To: Sunshine Rich MD  Sent: 7/20/2020 4:08 PM CDT  Subject: Prescription Question    I have my follow up visit on August 6 but the refill Rx I needed is for the Losartan. I requested a refill for it through [address]

## 2020-08-06 ENCOUNTER — OFFICE VISIT (OUTPATIENT)
Dept: FAMILY MEDICINE CLINIC | Facility: CLINIC | Age: 55
End: 2020-08-06
Payer: COMMERCIAL

## 2020-08-06 VITALS
HEART RATE: 83 BPM | HEIGHT: 67 IN | WEIGHT: 180 LBS | SYSTOLIC BLOOD PRESSURE: 140 MMHG | DIASTOLIC BLOOD PRESSURE: 90 MMHG | OXYGEN SATURATION: 98 % | BODY MASS INDEX: 28.25 KG/M2 | RESPIRATION RATE: 18 BRPM

## 2020-08-06 DIAGNOSIS — Z12.5 PROSTATE CANCER SCREENING: ICD-10-CM

## 2020-08-06 DIAGNOSIS — R73.01 IMPAIRED FASTING GLUCOSE: ICD-10-CM

## 2020-08-06 DIAGNOSIS — I10 ESSENTIAL HYPERTENSION: Primary | ICD-10-CM

## 2020-08-06 PROCEDURE — 3080F DIAST BP >= 90 MM HG: CPT | Performed by: FAMILY MEDICINE

## 2020-08-06 PROCEDURE — 99213 OFFICE O/P EST LOW 20 MIN: CPT | Performed by: FAMILY MEDICINE

## 2020-08-06 PROCEDURE — 3077F SYST BP >= 140 MM HG: CPT | Performed by: FAMILY MEDICINE

## 2020-08-06 PROCEDURE — 3008F BODY MASS INDEX DOCD: CPT | Performed by: FAMILY MEDICINE

## 2020-08-06 RX ORDER — LOSARTAN POTASSIUM 100 MG/1
TABLET ORAL
Qty: 45 TABLET | Refills: 3 | Status: SHIPPED | OUTPATIENT
Start: 2020-08-06 | End: 2021-10-14

## 2020-08-07 NOTE — PROGRESS NOTES
Here to follow-up on hypertension. Not taking blood pressures at home. Denies headaches, dizziness, chest pain, or chest pressure. Would like to get his labs done. Will schedule a full physical in the fall.   Taking losartan and montelukast for allergie impaired fasting glucose    Plans refilled losartan 50 mg daily. Follow-up for full physical.  Lipids, CMP, PSA, hemoglobin A1c ordered.

## 2020-08-23 LAB
ALBUMIN/GLOBULIN RATIO: 1.4 (CALC) (ref 1–2.5)
ALBUMIN: 4.3 G/DL (ref 3.6–5.1)
ALKALINE PHOSPHATASE: 75 U/L (ref 35–144)
ALT: 29 U/L (ref 9–46)
AST: 24 U/L (ref 10–35)
BILIRUBIN, TOTAL: 0.4 MG/DL (ref 0.2–1.2)
BUN: 18 MG/DL (ref 7–25)
CALCIUM: 9.7 MG/DL (ref 8.6–10.3)
CARBON DIOXIDE: 27 MMOL/L (ref 20–32)
CHLORIDE: 105 MMOL/L (ref 98–110)
CHOL/HDLC RATIO: 5.5 (CALC)
CHOLESTEROL, TOTAL: 216 MG/DL
CREATININE: 1.07 MG/DL (ref 0.7–1.33)
EGFR IF AFRICN AM: 91 ML/MIN/1.73M2
EGFR IF NONAFRICN AM: 79 ML/MIN/1.73M2
GLOBULIN: 3.1 G/DL (CALC) (ref 1.9–3.7)
GLUCOSE: 100 MG/DL (ref 65–99)
HDL CHOLESTEROL: 39 MG/DL
HEMOGLOBIN A1C: 5.6 % OF TOTAL HGB
LDL-CHOLESTEROL: 151 MG/DL (CALC)
NON-HDL CHOLESTEROL: 177 MG/DL (CALC)
POTASSIUM: 4.9 MMOL/L (ref 3.5–5.3)
PROTEIN, TOTAL: 7.4 G/DL (ref 6.1–8.1)
PSA, TOTAL: 0.9 NG/ML
SODIUM: 141 MMOL/L (ref 135–146)
TRIGLYCERIDES: 131 MG/DL

## 2020-09-16 ENCOUNTER — OFFICE VISIT (OUTPATIENT)
Dept: FAMILY MEDICINE CLINIC | Facility: CLINIC | Age: 55
End: 2020-09-16
Payer: COMMERCIAL

## 2020-09-16 VITALS
SYSTOLIC BLOOD PRESSURE: 122 MMHG | BODY MASS INDEX: 27.78 KG/M2 | WEIGHT: 177 LBS | HEIGHT: 67 IN | OXYGEN SATURATION: 99 % | HEART RATE: 97 BPM | DIASTOLIC BLOOD PRESSURE: 86 MMHG | TEMPERATURE: 97 F | RESPIRATION RATE: 16 BRPM

## 2020-09-16 DIAGNOSIS — R73.01 IMPAIRED FASTING GLUCOSE: ICD-10-CM

## 2020-09-16 DIAGNOSIS — I10 ESSENTIAL HYPERTENSION: ICD-10-CM

## 2020-09-16 DIAGNOSIS — K21.9 GASTROESOPHAGEAL REFLUX DISEASE, ESOPHAGITIS PRESENCE NOT SPECIFIED: ICD-10-CM

## 2020-09-16 DIAGNOSIS — E78.00 PURE HYPERCHOLESTEROLEMIA: ICD-10-CM

## 2020-09-16 DIAGNOSIS — Z00.00 ROUTINE GENERAL MEDICAL EXAMINATION AT A HEALTH CARE FACILITY: Primary | ICD-10-CM

## 2020-09-16 DIAGNOSIS — J45.20 INTERMITTENT ASTHMA WITHOUT COMPLICATION, UNSPECIFIED ASTHMA SEVERITY: ICD-10-CM

## 2020-09-16 DIAGNOSIS — Z23 NEEDS FLU SHOT: ICD-10-CM

## 2020-09-16 PROCEDURE — 3008F BODY MASS INDEX DOCD: CPT | Performed by: FAMILY MEDICINE

## 2020-09-16 PROCEDURE — 90471 IMMUNIZATION ADMIN: CPT | Performed by: FAMILY MEDICINE

## 2020-09-16 PROCEDURE — 99396 PREV VISIT EST AGE 40-64: CPT | Performed by: FAMILY MEDICINE

## 2020-09-16 PROCEDURE — 90686 IIV4 VACC NO PRSV 0.5 ML IM: CPT | Performed by: FAMILY MEDICINE

## 2020-09-16 PROCEDURE — 3074F SYST BP LT 130 MM HG: CPT | Performed by: FAMILY MEDICINE

## 2020-09-16 PROCEDURE — 3079F DIAST BP 80-89 MM HG: CPT | Performed by: FAMILY MEDICINE

## 2020-09-16 RX ORDER — ALBUTEROL SULFATE 90 UG/1
AEROSOL, METERED RESPIRATORY (INHALATION)
Qty: 1 INHALER | Refills: 1 | Status: SHIPPED | OUTPATIENT
Start: 2020-09-16 | End: 2021-04-28

## 2020-09-16 RX ORDER — PANTOPRAZOLE SODIUM 40 MG/1
40 TABLET, DELAYED RELEASE ORAL
Qty: 90 TABLET | Refills: 0 | Status: SHIPPED | OUTPATIENT
Start: 2020-09-16 | End: 2021-02-12

## 2020-09-16 NOTE — PATIENT INSTRUCTIONS
Use the Pantoprazole for 8 weeks every day,consider taking with dinner since more symptoms overnight. After 8 weeks stop and see how often you need to take the remainder.

## 2020-09-16 NOTE — PROGRESS NOTES
HPI:  Here for a physical.    PAST MEDICAL HISTORY:  Past Medical History:   Diagnosis Date   • Calculus of gallbladder without mention of cholecystitis or obstruction    • Chronic rhinitis    • Extrinsic asthma, unspecified     ALLERGY INDUCED ASTHMA   • No complaints of diplopia or blurred vision. EARS: No complaints of tinnitus or decreased hearing acuity. CARDIOVASCULAR: No complaints of chest pain with exertion, no PND, orthopnea, or edema. No decrease in exercise tolerance.   PULMONARY: No complaints Bilateral dorsalis pedis and posterior tibial pulses 2+/4+. No edema of the bilateral lower extremities. No JVD noted. PULMONARY: CTA bilaterally, good air exchange, no rhonchi, wheezes, or rales. No dullness to percussion.   ABDOMEN: Soft, nontender, nond

## 2020-12-30 DIAGNOSIS — J30.1 NON-SEASONAL ALLERGIC RHINITIS DUE TO POLLEN: ICD-10-CM

## 2020-12-30 DIAGNOSIS — J45.20 INTERMITTENT ASTHMA WITHOUT COMPLICATION, UNSPECIFIED ASTHMA SEVERITY: ICD-10-CM

## 2020-12-31 RX ORDER — MONTELUKAST SODIUM 10 MG/1
TABLET ORAL
Qty: 90 TABLET | Refills: 3 | Status: SHIPPED | OUTPATIENT
Start: 2020-12-31 | End: 2021-11-23

## 2020-12-31 RX ORDER — HYDROCHLOROTHIAZIDE 12.5 MG/1
CAPSULE, GELATIN COATED ORAL
Qty: 90 CAPSULE | Refills: 3 | Status: SHIPPED | OUTPATIENT
Start: 2020-12-31 | End: 2021-11-23

## 2021-02-12 ENCOUNTER — OFFICE VISIT (OUTPATIENT)
Dept: FAMILY MEDICINE CLINIC | Facility: CLINIC | Age: 56
End: 2021-02-12
Payer: COMMERCIAL

## 2021-02-12 VITALS
RESPIRATION RATE: 20 BRPM | HEART RATE: 83 BPM | SYSTOLIC BLOOD PRESSURE: 118 MMHG | BODY MASS INDEX: 27 KG/M2 | OXYGEN SATURATION: 98 % | DIASTOLIC BLOOD PRESSURE: 78 MMHG | WEIGHT: 172 LBS | HEIGHT: 67 IN | TEMPERATURE: 98 F

## 2021-02-12 DIAGNOSIS — M54.50 ACUTE BILATERAL LOW BACK PAIN WITHOUT SCIATICA: Primary | ICD-10-CM

## 2021-02-12 PROCEDURE — 3008F BODY MASS INDEX DOCD: CPT | Performed by: FAMILY MEDICINE

## 2021-02-12 PROCEDURE — 3074F SYST BP LT 130 MM HG: CPT | Performed by: FAMILY MEDICINE

## 2021-02-12 PROCEDURE — 3078F DIAST BP <80 MM HG: CPT | Performed by: FAMILY MEDICINE

## 2021-02-12 PROCEDURE — 99213 OFFICE O/P EST LOW 20 MIN: CPT | Performed by: FAMILY MEDICINE

## 2021-02-12 RX ORDER — IBUPROFEN 800 MG/1
400 TABLET ORAL EVERY 8 HOURS PRN
Qty: 30 TABLET | Refills: 0 | Status: SHIPPED | OUTPATIENT
Start: 2021-02-12

## 2021-02-12 RX ORDER — CYCLOBENZAPRINE HCL 10 MG
10 TABLET ORAL 3 TIMES DAILY PRN
Qty: 30 TABLET | Refills: 0 | Status: SHIPPED | OUTPATIENT
Start: 2021-02-12

## 2021-02-12 NOTE — PATIENT INSTRUCTIONS
Do psoas stretches regularly. Exercises to Strengthen Your Lower Back  Strong lower back and abdominal muscles work together to support your spine. The exercises below will help strengthen the lower back.  It's important that you start exercising slowl 4. Straight leg raise. Bend one leg at the knee and keep the other leg straight. Tighten your stomach muscles. Slowly lift your straight leg 6 to 12 inches off the floor and hold for up to 5 seconds. Repeat 10 times on each side. Standin.  Wall squats 4. Abdominal crunch. Perform a pelvic tilt (above) flattening your lower back against the floor. Holding the tension in your abdominal muscles, take another breath and raise your shoulder blades off the ground (this is not a full sit-up).  Keep your head in

## 2021-02-12 NOTE — PROGRESS NOTES
HPI:    Patient ID: Eliana Miller is a 50year old male. Back Pain  This is a new problem. Episode onset: 2/4/21 after shoveling snow. The problem occurs constantly. The problem has been waxing and waning since onset.  The pain is present in the lumbar spine eye exhibits no discharge. Cardiovascular: Normal rate, regular rhythm and normal heart sounds. Pulmonary/Chest: Effort normal and breath sounds normal. No respiratory distress. He has no wheezes.    Musculoskeletal:      Comments: No back tenderness to

## 2021-02-16 ENCOUNTER — HOSPITAL ENCOUNTER (OUTPATIENT)
Age: 56
Discharge: HOME | End: 2021-02-16
Payer: COMMERCIAL

## 2021-02-16 ENCOUNTER — APPOINTMENT (OUTPATIENT)
Dept: GENERAL RADIOLOGY | Age: 56
End: 2021-02-16
Attending: PHYSICIAN ASSISTANT
Payer: COMMERCIAL

## 2021-02-16 ENCOUNTER — APPOINTMENT (OUTPATIENT)
Dept: CT IMAGING | Age: 56
End: 2021-02-16
Attending: PHYSICIAN ASSISTANT
Payer: COMMERCIAL

## 2021-02-16 ENCOUNTER — NURSE ONLY (OUTPATIENT)
Dept: INFUSION CENTER | Age: 56
End: 2021-02-16
Attending: PHYSICIAN ASSISTANT
Payer: COMMERCIAL

## 2021-02-16 VITALS
DIASTOLIC BLOOD PRESSURE: 84 MMHG | TEMPERATURE: 99 F | RESPIRATION RATE: 16 BRPM | SYSTOLIC BLOOD PRESSURE: 117 MMHG | HEART RATE: 87 BPM | BODY MASS INDEX: 26 KG/M2 | OXYGEN SATURATION: 96 % | WEIGHT: 169 LBS

## 2021-02-16 VITALS
BODY MASS INDEX: 28.88 KG/M2 | HEIGHT: 64.17 IN | WEIGHT: 169.19 LBS | DIASTOLIC BLOOD PRESSURE: 91 MMHG | SYSTOLIC BLOOD PRESSURE: 133 MMHG | OXYGEN SATURATION: 99 % | TEMPERATURE: 100 F | RESPIRATION RATE: 18 BRPM | HEART RATE: 100 BPM

## 2021-02-16 DIAGNOSIS — R31.9 HEMATURIA, UNSPECIFIED TYPE: ICD-10-CM

## 2021-02-16 DIAGNOSIS — U07.1 LAB TEST POSITIVE FOR DETECTION OF COVID-19 VIRUS: Primary | ICD-10-CM

## 2021-02-16 DIAGNOSIS — J18.9 COMMUNITY ACQUIRED PNEUMONIA OF RIGHT LOWER LOBE OF LUNG: ICD-10-CM

## 2021-02-16 LAB
POCT GLUCOSE URINE: NEGATIVE MG/DL
POCT KETONE URINE: NEGATIVE MG/DL
POCT LEUKOCYTE ESTERASE URINE: NEGATIVE
POCT NITRITE URINE: NEGATIVE
POCT PH URINE: 6 (ref 5–8)
POCT SPECIFIC GRAVITY URINE: 1.02
POCT URINE CLARITY: CLEAR
POCT URINE COLOR: YELLOW
POCT UROBILINOGEN URINE: 0.2 MG/DL
SARS-COV-2 RNA RESP QL NAA+PROBE: DETECTED

## 2021-02-16 PROCEDURE — 74176 CT ABD & PELVIS W/O CONTRAST: CPT | Performed by: PHYSICIAN ASSISTANT

## 2021-02-16 PROCEDURE — 81002 URINALYSIS NONAUTO W/O SCOPE: CPT | Performed by: PHYSICIAN ASSISTANT

## 2021-02-16 PROCEDURE — 99204 OFFICE O/P NEW MOD 45 MIN: CPT

## 2021-02-16 PROCEDURE — 71046 X-RAY EXAM CHEST 2 VIEWS: CPT | Performed by: PHYSICIAN ASSISTANT

## 2021-02-16 PROCEDURE — 99214 OFFICE O/P EST MOD 30 MIN: CPT

## 2021-02-16 RX ORDER — ACETAMINOPHEN 500 MG
1000 TABLET ORAL ONCE
Status: DISCONTINUED | OUTPATIENT
Start: 2021-02-16 | End: 2021-02-16

## 2021-02-16 RX ORDER — DOXYCYCLINE HYCLATE 100 MG/1
100 CAPSULE ORAL 2 TIMES DAILY
Qty: 20 CAPSULE | Refills: 0 | Status: SHIPPED | OUTPATIENT
Start: 2021-02-16 | End: 2021-02-26

## 2021-02-16 NOTE — ED INITIAL ASSESSMENT (HPI)
Pt here w/ c/o body aches for a couple of days and back pain onset a couple of weeks. 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

## 2021-02-16 NOTE — PATIENT INSTRUCTIONS
Fact Sheet for Patients, Parents and Caregivers   Emergency Use Authorization (EUA) of Bamlanivimab for Coronavirus Disease 2019 (COVID-19)    You are being given a medicine called bamlanivimab for the treatment of coronavirus disease 2019 (Aubree Arellano).  Anisha Peterson investigational because it is still being studied. There is limited information known about the safety or effectiveness of using bamlanivimab to treat people with COVID-19.     The FDA has authorized the emergency use of bamlanivimab for the treatment of CO known at this time. It is possible that bamlanivimab could interfere with your body's own ability to fight off a future infection of SARS-CoV-2. Similarly, bamlanivimab may reduce your body’s immune response to a vaccine for SARS-CoV-2.  Specific studies bamlanivimab available under an emergency access mechanism called an EUA.  The EUA is supported by a  of Health and Human Service (HHS) declaration that circumstances exist to justify the emergency use of drugs and biological products during the CO

## 2021-02-16 NOTE — PROGRESS NOTES
Pt was seen at  [facility] today and meets criteria for monoclonal antibody infusion per [facility]. Pt moved to monoclonal infusion after discharged. Pt verbalized understanding of medication that will be infused and has no further questions when asked.    Pt m

## 2021-02-16 NOTE — PROGRESS NOTES
No adverse reaction noted. Vital signs rechecked. AVS given and verbalized understanding. Pt discharged in good condition, alert, breathing easy with gait steady.

## 2021-02-16 NOTE — ED PROVIDER NOTES
Patient Seen in: [facility]      History   Patient presents with:  Back Pain  Fever    Stated Complaint: BODY ACHES     HPI/Subjective:   HPI    62-year-old male who works with [employer] here with complaint of a several day history of body ac %   O2 Device None (Room air)       Current:BP (!) 133/91   Pulse 100   Temp 100 °F (37.8 °C) (Temporal)   Resp 18   Ht 163 cm (5' 4.17\")   Wt 76.7 kg   SpO2 99%   BMI 28.89 kg/m²         Physical Exam  Vitals signs and nursing note reviewed.    Ilana urine 100 mg/dL (*)     All other components within normal limits   RAPID SARS-COV-2 BY PCR - Abnormal; Notable for the following components:    Rapid SARS-CoV-2 by PCR Detected (*)     All other components within normal limits     Xr Chest Pa + Lat Chest FINDINGS:  KIDNEYS:  Multiple cysts in left kidney. Representative cyst measures 2.7 x 2.5 cm in the lower pole. Renal collecting systems are not dilated. ADRENALS:  No mass or enlargement.   LIVER:  No enlargement, atrophy, abnormal density, or signif the past 2 to  days. MDM     Clinical Impression: Covid +/RLL infiltrate/hematuria  Course of Treatment: Patient will be discharged from here to receive the immunoglobulin infusion.   We will also cover with an outpatient antibiotic due to the right lowe

## 2021-02-16 NOTE — PROGRESS NOTES
Bamlanivimab Infusion completed and discontinued. 0.9 NS 50 ml flush x 15 mins started and infusing well. No adverse reaction noted from  Infusion given.

## 2021-02-17 ENCOUNTER — PATIENT OUTREACH (OUTPATIENT)
Dept: CASE MANAGEMENT | Age: 56
End: 2021-02-17

## 2021-02-17 NOTE — PROGRESS NOTES
Home Monitoring Condition Update    Covid19+ test date: 2/16/2021      Consent Verification:  Assessment Completed With: Patient  HIPAA Verified?   Yes    COVID-19 HOME MONITORING 2/17/2021   Temperature (No Data)   How are you feeling Okay   Short of ryan you received the monoclonal AB treatment in the ED, are you experiencing any of the following: fever, weakness, difficulty breathing, hives, joint pain, rashes, itching, tongue or lip swelling or wheezing?  No  If yes, we will contact your doctor/on call pr

## 2021-02-18 ENCOUNTER — PATIENT OUTREACH (OUTPATIENT)
Dept: CASE MANAGEMENT | Age: 56
End: 2021-02-18

## 2021-02-18 NOTE — PROGRESS NOTES
Home Monitoring Condition Update    Covid19+ test date: 2/16/2021      Consent Verification:  Assessment Completed With: Patient  HIPAA Verified?   Yes    COVID-19 HOME MONITORING 2/18/2021   Temperature (No Data)   How are you feeling Good   Short of ryan lip swelling or wheezing? No  If yes, we will contact your doctor/on call provider or if you are experiencing a severe reaction, please call 911 for emergency assistance.    If no, and you develop any symptoms, please contact your doctor/on call provider or

## 2021-02-19 ENCOUNTER — PATIENT OUTREACH (OUTPATIENT)
Dept: CASE MANAGEMENT | Age: 56
End: 2021-02-19

## 2021-02-19 NOTE — PROGRESS NOTES
Home Monitoring Condition Update    Covid19+ test date: 2/16/2021      Consent Verification:  Assessment Completed With: Patient  HIPAA Verified?   Yes    COVID-19 HOME MONITORING 2/19/2021   Temperature (No Data)   How are you feeling Well   Short of ryan swelling or wheezing? No  If yes, we will contact your doctor/on call provider or if you are experiencing a severe reaction, please call 911 for emergency assistance.    If no, and you develop any symptoms, please contact your doctor/on call provider or if

## 2021-02-22 ENCOUNTER — PATIENT OUTREACH (OUTPATIENT)
Dept: CASE MANAGEMENT | Age: 56
End: 2021-02-22

## 2021-02-22 NOTE — PROGRESS NOTES
Home Monitoring Condition Update    Covid19+ test date: 2/16/21      Consent Verification:  Assessment Completed With: Patient  HIPAA Verified?   Yes    COVID-19 HOME MONITORING 2/22/2021   Temperature 98   Reading From Mouth   SPO2 96   Pulse taken from reaction, please go to nearest ER or call 911 for emergency assistance. Advised patient to monitor temp and HR twice/day for trends, get plenty of rest, push fluids and take Tylenol 1,000 mg q 6 hr PRN. Patient verbalized understanding.  NCM also expla

## 2021-02-23 ENCOUNTER — PATIENT OUTREACH (OUTPATIENT)
Dept: CASE MANAGEMENT | Age: 56
End: 2021-02-23

## 2021-02-23 NOTE — PROGRESS NOTES
Home Monitoring Condition Update    Covid19+ test date: 2/16/21      Consent Verification:  Assessment Completed With: Patient  HIPAA Verified?   Yes    COVID-19 HOME MONITORING 2/23/2021   Temperature 97   Reading From Mouth   SPO2 97   Pulse 70   Pulse experiencing a severe reaction, please go to nearest ER or call 911 for emergency assistance. Advised patient to monitor temp and HR twice/day for trends, get plenty of rest, push fluids and take Tylenol 1,000 mg q 6 hr PRN.   Patient verbalized underst

## 2021-02-24 ENCOUNTER — PATIENT OUTREACH (OUTPATIENT)
Dept: CASE MANAGEMENT | Age: 56
End: 2021-02-24

## 2021-02-24 NOTE — PROGRESS NOTES
Home Monitoring Condition Update    Covid19+ test date: 2/16/21      Consent Verification:  Assessment Completed With: Patient  HIPAA Verified?   Yes    COVID-19 HOME MONITORING 2/24/2021   Temperature 97.1   Reading From Mouth   SPO2 96   Pulse 72   Puls a severe reaction, please go to nearest ER or call 911 for emergency assistance. Advised patient to monitor temp and HR twice/day for trends, get plenty of rest, push fluids and take Tylenol 1,000 mg q 6 hr PRN. Patient verbalized understanding.  NCM a

## 2021-02-25 ENCOUNTER — PATIENT OUTREACH (OUTPATIENT)
Dept: CASE MANAGEMENT | Age: 56
End: 2021-02-25

## 2021-02-25 NOTE — PROGRESS NOTES
Home Monitoring Condition Update    Covid19+ test date: 2/16/21      Consent Verification:  Assessment Completed With: Patient  HIPAA Verified?   Yes    COVID-19 HOME MONITORING 2/25/2021   Temperature 97.9   Reading From Mouth   SPO2 97   Pulse 77   Puls severe reaction, please go to nearest ER or call 911 for emergency assistance. Advised patient to monitor temp and HR twice/day for trends, get plenty of rest, push fluids and take Tylenol 1,000 mg q 6 hr PRN. Patient verbalized understanding.  NCM als

## 2021-02-26 ENCOUNTER — TELEMEDICINE (OUTPATIENT)
Dept: FAMILY MEDICINE CLINIC | Facility: CLINIC | Age: 56
End: 2021-02-26

## 2021-02-26 ENCOUNTER — TELEPHONE (OUTPATIENT)
Dept: CASE MANAGEMENT | Age: 56
End: 2021-02-26

## 2021-02-26 DIAGNOSIS — U07.1 COVID-19: Primary | ICD-10-CM

## 2021-02-26 PROCEDURE — 99213 OFFICE O/P EST LOW 20 MIN: CPT | Performed by: FAMILY MEDICINE

## 2021-02-26 NOTE — TELEPHONE ENCOUNTER
Patient has virtual visit today. After reading VV notes, per PCP may return to work and is asymptomatic.  Patient is cleared to be removed from program.

## 2021-02-26 NOTE — PROGRESS NOTES
Virtual video Check-In    Brandin MARTIN Ico verbally consents to a Virtual/Telephone Check-In visit on 02/26/21. Patient understands and accepts financial responsibility for any deductible, co-insurance and/or co-pays associated with this service.     [place] ALLERGIES:   Dust, Grass, Mold, and Ragweed  FAMILY HISTORY  Family History   Problem Relation Age of Onset   • Allergies Father    • Hypertension Mother    • Allergies Daughter    • Allergies Son    • Asthma Brother    • Cancer Neg    • Heart Disease decision making. Appropriate medical decision-making and tests are ordered as detailed in the plan of care above.

## 2021-04-28 DIAGNOSIS — J45.20 INTERMITTENT ASTHMA WITHOUT COMPLICATION, UNSPECIFIED ASTHMA SEVERITY: ICD-10-CM

## 2021-04-28 RX ORDER — ALBUTEROL SULFATE 90 UG/1
AEROSOL, METERED RESPIRATORY (INHALATION)
Qty: 1 INHALER | Refills: 1 | Status: SHIPPED | OUTPATIENT
Start: 2021-04-28 | End: 2022-01-29

## 2021-10-14 RX ORDER — LOSARTAN POTASSIUM 100 MG/1
TABLET ORAL
Qty: 45 TABLET | Refills: 0 | Status: SHIPPED | OUTPATIENT
Start: 2021-10-14

## 2021-11-22 DIAGNOSIS — J45.20 INTERMITTENT ASTHMA WITHOUT COMPLICATION, UNSPECIFIED ASTHMA SEVERITY: ICD-10-CM

## 2021-11-22 DIAGNOSIS — J30.1 NON-SEASONAL ALLERGIC RHINITIS DUE TO POLLEN: ICD-10-CM

## 2021-11-23 RX ORDER — MONTELUKAST SODIUM 10 MG/1
TABLET ORAL
Qty: 90 TABLET | Refills: 3 | Status: SHIPPED | OUTPATIENT
Start: 2021-11-23

## 2021-11-23 RX ORDER — HYDROCHLOROTHIAZIDE 12.5 MG/1
CAPSULE, GELATIN COATED ORAL
Qty: 90 CAPSULE | Refills: 3 | Status: SHIPPED | OUTPATIENT
Start: 2021-11-23

## 2022-01-29 DIAGNOSIS — J45.20 INTERMITTENT ASTHMA WITHOUT COMPLICATION, UNSPECIFIED ASTHMA SEVERITY: ICD-10-CM

## 2022-01-31 RX ORDER — ALBUTEROL SULFATE 90 UG/1
AEROSOL, METERED RESPIRATORY (INHALATION)
Qty: 1 EACH | Refills: 0 | Status: SHIPPED | OUTPATIENT
Start: 2022-01-31

## 2022-01-31 NOTE — TELEPHONE ENCOUNTER
Sent patient reminder he is overdue for physical and or follow up  LV:2/26/2021 TELEMED  LR:4/28/2021

## 2022-02-14 RX ORDER — LOSARTAN POTASSIUM 100 MG/1
TABLET ORAL
Qty: 45 TABLET | Refills: 0 | Status: SHIPPED | OUTPATIENT
Start: 2022-02-14

## 2022-03-15 ENCOUNTER — OFFICE VISIT (OUTPATIENT)
Dept: FAMILY MEDICINE CLINIC | Facility: CLINIC | Age: 57
End: 2022-03-15
Payer: COMMERCIAL

## 2022-03-15 VITALS
DIASTOLIC BLOOD PRESSURE: 84 MMHG | HEART RATE: 71 BPM | OXYGEN SATURATION: 97 % | HEIGHT: 65.5 IN | RESPIRATION RATE: 18 BRPM | WEIGHT: 183.38 LBS | SYSTOLIC BLOOD PRESSURE: 122 MMHG | BODY MASS INDEX: 30.19 KG/M2

## 2022-03-15 DIAGNOSIS — N40.1 BENIGN PROSTATIC HYPERPLASIA WITH NOCTURIA: ICD-10-CM

## 2022-03-15 DIAGNOSIS — Z00.00 ROUTINE GENERAL MEDICAL EXAMINATION AT A HEALTH CARE FACILITY: Primary | ICD-10-CM

## 2022-03-15 DIAGNOSIS — R35.1 BENIGN PROSTATIC HYPERPLASIA WITH NOCTURIA: ICD-10-CM

## 2022-03-15 DIAGNOSIS — Z23 NEED FOR DIPHTHERIA-TETANUS-PERTUSSIS (TDAP) VACCINE: ICD-10-CM

## 2022-03-15 DIAGNOSIS — I10 ESSENTIAL HYPERTENSION: ICD-10-CM

## 2022-03-15 DIAGNOSIS — E78.00 PURE HYPERCHOLESTEROLEMIA: ICD-10-CM

## 2022-03-15 PROCEDURE — 90471 IMMUNIZATION ADMIN: CPT | Performed by: FAMILY MEDICINE

## 2022-03-15 PROCEDURE — 3074F SYST BP LT 130 MM HG: CPT | Performed by: FAMILY MEDICINE

## 2022-03-15 PROCEDURE — 99396 PREV VISIT EST AGE 40-64: CPT | Performed by: FAMILY MEDICINE

## 2022-03-15 PROCEDURE — 3079F DIAST BP 80-89 MM HG: CPT | Performed by: FAMILY MEDICINE

## 2022-03-15 PROCEDURE — 90715 TDAP VACCINE 7 YRS/> IM: CPT | Performed by: FAMILY MEDICINE

## 2022-03-15 PROCEDURE — 3008F BODY MASS INDEX DOCD: CPT | Performed by: FAMILY MEDICINE

## 2022-03-15 RX ORDER — FINASTERIDE 5 MG/1
5 TABLET, FILM COATED ORAL DAILY
Qty: 90 TABLET | Refills: 3 | Status: SHIPPED | OUTPATIENT
Start: 2022-03-15 | End: 2023-03-10

## 2022-03-15 RX ORDER — HYDROCHLOROTHIAZIDE 12.5 MG/1
12.5 CAPSULE, GELATIN COATED ORAL DAILY
COMMUNITY

## 2022-03-18 LAB
ALBUMIN/GLOBULIN RATIO: 1.2 (CALC) (ref 1–2.5)
ALBUMIN: 4.2 G/DL (ref 3.6–5.1)
ALKALINE PHOSPHATASE: 73 U/L (ref 35–144)
ALT: 33 U/L (ref 9–46)
AST: 22 U/L (ref 10–35)
BILIRUBIN, TOTAL: 0.6 MG/DL (ref 0.2–1.2)
BUN: 15 MG/DL (ref 7–25)
CALCIUM: 9.5 MG/DL (ref 8.6–10.3)
CARBON DIOXIDE: 25 MMOL/L (ref 20–32)
CHLORIDE: 101 MMOL/L (ref 98–110)
CHOL/HDLC RATIO: 6.4 (CALC)
CHOLESTEROL, TOTAL: 238 MG/DL
CREATININE: 0.98 MG/DL (ref 0.7–1.33)
EGFR IF AFRICN AM: 100 ML/MIN/1.73M2
EGFR IF NONAFRICN AM: 86 ML/MIN/1.73M2
GLOBULIN: 3.4 G/DL (CALC) (ref 1.9–3.7)
GLUCOSE: 104 MG/DL (ref 65–99)
HDL CHOLESTEROL: 37 MG/DL
HEMOGLOBIN A1C: 5.8 % OF TOTAL HGB
LDL-CHOLESTEROL: 160 MG/DL (CALC)
NON-HDL CHOLESTEROL: 201 MG/DL (CALC)
POTASSIUM: 4.1 MMOL/L (ref 3.5–5.3)
PROTEIN, TOTAL: 7.6 G/DL (ref 6.1–8.1)
SODIUM: 138 MMOL/L (ref 135–146)
TRIGLYCERIDES: 250 MG/DL

## 2022-03-18 RX ORDER — ROSUVASTATIN CALCIUM 10 MG/1
10 TABLET, COATED ORAL NIGHTLY
Qty: 30 TABLET | Refills: 2 | Status: SHIPPED | OUTPATIENT
Start: 2022-03-18

## 2022-05-02 ENCOUNTER — HOSPITAL ENCOUNTER (OUTPATIENT)
Age: 57
Discharge: HOME | End: 2022-05-02
Attending: EMERGENCY MEDICINE
Payer: COMMERCIAL

## 2022-05-02 VITALS
HEART RATE: 94 BPM | BODY MASS INDEX: 28.93 KG/M2 | DIASTOLIC BLOOD PRESSURE: 97 MMHG | TEMPERATURE: 99 F | HEIGHT: 66 IN | WEIGHT: 180 LBS | RESPIRATION RATE: 16 BRPM | OXYGEN SATURATION: 95 % | SYSTOLIC BLOOD PRESSURE: 152 MMHG

## 2022-05-02 DIAGNOSIS — U07.1 COVID-19: Primary | ICD-10-CM

## 2022-05-02 LAB — SARS-COV-2 RNA RESP QL NAA+PROBE: DETECTED

## 2022-05-02 PROCEDURE — 99214 OFFICE O/P EST MOD 30 MIN: CPT

## 2022-05-02 RX ORDER — BEBTELOVIMAB 87.5 MG/ML
175 INJECTION, SOLUTION INTRAVENOUS ONCE
Status: COMPLETED | OUTPATIENT
Start: 2022-05-02 | End: 2022-05-02

## 2022-05-02 NOTE — ED INITIAL ASSESSMENT (HPI)
Pt aox4. Pt c/o sneezing, head congestion, cough started yesterday. Pt was exposed to co-workers that tested + Covid.

## 2022-05-03 ENCOUNTER — TELEPHONE (OUTPATIENT)
Dept: CASE MANAGEMENT | Age: 57
End: 2022-05-03

## 2022-05-03 RX ORDER — ALBUTEROL SULFATE 90 UG/1
AEROSOL, METERED RESPIRATORY (INHALATION)
Qty: 1 EACH | Refills: 0 | Status: SHIPPED | OUTPATIENT
Start: 2022-05-03

## 2022-05-03 NOTE — TELEPHONE ENCOUNTER
Pt received MAB infusion at [address] [place] [address] on 5/2/22for COVID-19. Please follow-up with pt for post-infusion assessment and home monitoring if needed. Thank you.

## 2022-05-03 NOTE — TELEPHONE ENCOUNTER
Called pt to discuss condition post polyclonal antibody treatment:    Any allergic reaction symptoms? Difficulty breathing: No    Hives: No    Joint pain: No    Rashes: No    Itching: No    Tongue/lip swelling: No    Wheezing: No    Any questions or concerns?  no

## 2022-05-03 NOTE — TELEPHONE ENCOUNTER
Pt states he developed sneeze/congestion Monday AM. Thought it was his allergies. Took covid test because he found out 2 coworkers are positive and he was +. Reports that he feels good. Normal appetite, little cough and SOB, but he said feels same as when his allergies act up. Pt would like refill of Proair. He is using PRN. Advised I'd send request on and we will call on 5/4/22 to check in with him. Pended ProAir if agreeable.

## 2022-05-04 NOTE — TELEPHONE ENCOUNTER
Called pt to follow up on Home monitoring. Pt states feeling good, has intermittent cough and runny nose otherwise feels good. Advised we would call tomorrow for another update.

## 2022-05-05 NOTE — TELEPHONE ENCOUNTER
Called pt for update, states he is feeling better than yesterday, no fever, no sob, + intermittent cough, no diarrhea, no vomitting, eating well. No fatigue or pain. Not taking any otc meds. Continue home monitoring?

## 2022-05-23 ENCOUNTER — APPOINTMENT (OUTPATIENT)
Dept: CT IMAGING | Age: 57
End: 2022-05-23
Attending: EMERGENCY MEDICINE
Payer: COMMERCIAL

## 2022-05-23 ENCOUNTER — HOSPITAL ENCOUNTER (OUTPATIENT)
Age: 57
Discharge: HOME | End: 2022-05-23
Attending: EMERGENCY MEDICINE
Payer: COMMERCIAL

## 2022-05-23 VITALS
BODY MASS INDEX: 28.93 KG/M2 | SYSTOLIC BLOOD PRESSURE: 149 MMHG | RESPIRATION RATE: 16 BRPM | HEART RATE: 82 BPM | TEMPERATURE: 98 F | OXYGEN SATURATION: 97 % | HEIGHT: 66 IN | WEIGHT: 180 LBS | DIASTOLIC BLOOD PRESSURE: 91 MMHG

## 2022-05-23 DIAGNOSIS — M54.9 BACK PAIN WITHOUT RADIATION: Primary | ICD-10-CM

## 2022-05-23 LAB
#MXD IC: 0.4 X10ˆ3/UL (ref 0.1–1)
BUN BLD-MCNC: 14 MG/DL (ref 7–18)
CHLORIDE BLD-SCNC: 100 MMOL/L (ref 98–112)
CO2 BLD-SCNC: 27 MMOL/L (ref 21–32)
CREAT BLD-MCNC: 0.9 MG/DL
GLUCOSE BLD-MCNC: 117 MG/DL (ref 70–99)
HCT VFR BLD AUTO: 49.3 %
HCT VFR BLD CALC: 50 %
HGB BLD-MCNC: 16.5 G/DL
ISTAT IONIZED CALCIUM FOR CHEM 8: 1.22 MMOL/L (ref 1.12–1.32)
LYMPHOCYTES # BLD AUTO: 1.7 X10ˆ3/UL (ref 1–4)
LYMPHOCYTES NFR BLD AUTO: 23 %
MCH RBC QN AUTO: 31 PG (ref 26–34)
MCHC RBC AUTO-ENTMCNC: 33.5 G/DL (ref 31–37)
MCV RBC AUTO: 92.5 FL (ref 80–100)
MIXED CELL %: 5.5 %
NEUTROPHILS # BLD AUTO: 5.4 X10ˆ3/UL (ref 1.5–7.7)
NEUTROPHILS NFR BLD AUTO: 71.5 %
PLATELET # BLD AUTO: 375 X10ˆ3/UL (ref 150–450)
POCT BILIRUBIN URINE: NEGATIVE
POCT GLUCOSE URINE: NEGATIVE MG/DL
POCT KETONE URINE: NEGATIVE MG/DL
POCT LEUKOCYTE ESTERASE URINE: NEGATIVE
POCT NITRITE URINE: NEGATIVE
POCT PH URINE: 7 (ref 5–8)
POCT PROTEIN URINE: NEGATIVE MG/DL
POCT SPECIFIC GRAVITY URINE: 1.01
POCT URINE CLARITY: CLEAR
POCT URINE COLOR: YELLOW
POCT UROBILINOGEN URINE: 0.2 MG/DL
POTASSIUM BLD-SCNC: 3.6 MMOL/L (ref 3.6–5.1)
RBC # BLD AUTO: 5.33 X10ˆ6/UL
SODIUM BLD-SCNC: 138 MMOL/L (ref 136–145)
WBC # BLD AUTO: 7.5 X10ˆ3/UL (ref 4–11)

## 2022-05-23 PROCEDURE — 99214 OFFICE O/P EST MOD 30 MIN: CPT

## 2022-05-23 PROCEDURE — 74176 CT ABD & PELVIS W/O CONTRAST: CPT | Performed by: EMERGENCY MEDICINE

## 2022-05-23 PROCEDURE — 36415 COLL VENOUS BLD VENIPUNCTURE: CPT

## 2022-05-23 PROCEDURE — 81002 URINALYSIS NONAUTO W/O SCOPE: CPT | Performed by: EMERGENCY MEDICINE

## 2022-05-23 PROCEDURE — 80047 BASIC METABLC PNL IONIZED CA: CPT

## 2022-05-23 PROCEDURE — 85025 COMPLETE CBC W/AUTO DIFF WBC: CPT | Performed by: EMERGENCY MEDICINE

## 2022-05-23 RX ORDER — CYCLOBENZAPRINE HCL 10 MG
10 TABLET ORAL 3 TIMES DAILY PRN
Qty: 20 TABLET | Refills: 0 | Status: SHIPPED | OUTPATIENT
Start: 2022-05-23 | End: 2022-05-30

## 2022-05-23 RX ORDER — HYDROCODONE BITARTRATE AND ACETAMINOPHEN 5; 325 MG/1; MG/1
1-2 TABLET ORAL EVERY 6 HOURS PRN
Qty: 10 TABLET | Refills: 0 | Status: SHIPPED | OUTPATIENT
Start: 2022-05-23 | End: 2022-05-28

## 2022-05-23 RX ORDER — LOSARTAN POTASSIUM 100 MG/1
50 TABLET ORAL DAILY
Qty: 45 TABLET | Refills: 0 | Status: SHIPPED | OUTPATIENT
Start: 2022-05-23

## 2022-05-23 RX ORDER — IBUPROFEN 600 MG/1
600 TABLET ORAL ONCE
Status: COMPLETED | OUTPATIENT
Start: 2022-05-23 | End: 2022-05-23

## 2022-05-23 NOTE — ED INITIAL ASSESSMENT (HPI)
Since Saturday, c/o right sided low back pain. Unsure of cause. Has slight increase in urinary frequency.

## 2022-06-10 DIAGNOSIS — E78.00 PURE HYPERCHOLESTEROLEMIA: ICD-10-CM

## 2022-06-10 RX ORDER — ROSUVASTATIN CALCIUM 10 MG/1
TABLET, COATED ORAL
Qty: 90 TABLET | Refills: 0 | Status: SHIPPED | OUTPATIENT
Start: 2022-06-10

## 2022-08-19 RX ORDER — LOSARTAN POTASSIUM 100 MG/1
TABLET ORAL
Qty: 45 TABLET | Refills: 0 | Status: SHIPPED | OUTPATIENT
Start: 2022-08-19

## 2022-09-28 DIAGNOSIS — E78.00 PURE HYPERCHOLESTEROLEMIA: ICD-10-CM

## 2022-09-28 RX ORDER — ROSUVASTATIN CALCIUM 10 MG/1
TABLET, COATED ORAL
Qty: 90 TABLET | Refills: 0 | Status: SHIPPED | OUTPATIENT
Start: 2022-09-28

## 2022-10-27 DIAGNOSIS — J30.1 NON-SEASONAL ALLERGIC RHINITIS DUE TO POLLEN: ICD-10-CM

## 2022-10-27 DIAGNOSIS — J45.20 INTERMITTENT ASTHMA WITHOUT COMPLICATION, UNSPECIFIED ASTHMA SEVERITY: ICD-10-CM

## 2022-10-27 RX ORDER — MONTELUKAST SODIUM 10 MG/1
TABLET ORAL
Qty: 90 TABLET | Refills: 3 | Status: SHIPPED | OUTPATIENT
Start: 2022-10-27

## 2022-10-27 RX ORDER — HYDROCHLOROTHIAZIDE 12.5 MG/1
CAPSULE, GELATIN COATED ORAL
Qty: 90 CAPSULE | Refills: 3 | Status: SHIPPED | OUTPATIENT
Start: 2022-10-27

## 2022-11-25 RX ORDER — LOSARTAN POTASSIUM 100 MG/1
TABLET ORAL
Qty: 45 TABLET | Refills: 0 | Status: SHIPPED | OUTPATIENT
Start: 2022-11-25

## 2022-12-12 DIAGNOSIS — J45.20 INTERMITTENT ASTHMA WITHOUT COMPLICATION, UNSPECIFIED ASTHMA SEVERITY: ICD-10-CM

## 2022-12-12 RX ORDER — ALBUTEROL SULFATE 90 UG/1
AEROSOL, METERED RESPIRATORY (INHALATION)
Qty: 1 EACH | Refills: 0 | Status: SHIPPED | OUTPATIENT
Start: 2022-12-12

## 2022-12-28 DIAGNOSIS — E78.00 PURE HYPERCHOLESTEROLEMIA: ICD-10-CM

## 2022-12-28 RX ORDER — ROSUVASTATIN CALCIUM 10 MG/1
TABLET, COATED ORAL
Qty: 90 TABLET | Refills: 0 | Status: SHIPPED | OUTPATIENT
Start: 2022-12-28

## 2023-01-02 ENCOUNTER — HOSPITAL ENCOUNTER (OUTPATIENT)
Age: 58
Discharge: HOME | End: 2023-01-02
Payer: COMMERCIAL

## 2023-01-02 VITALS
TEMPERATURE: 98 F | HEART RATE: 88 BPM | SYSTOLIC BLOOD PRESSURE: 142 MMHG | OXYGEN SATURATION: 95 % | DIASTOLIC BLOOD PRESSURE: 89 MMHG | RESPIRATION RATE: 14 BRPM

## 2023-01-02 DIAGNOSIS — R09.81 NASAL CONGESTION: ICD-10-CM

## 2023-01-02 DIAGNOSIS — J45.21 MILD INTERMITTENT REACTIVE AIRWAY DISEASE WITH ACUTE EXACERBATION: Primary | ICD-10-CM

## 2023-01-02 PROCEDURE — 99214 OFFICE O/P EST MOD 30 MIN: CPT

## 2023-01-02 PROCEDURE — 99213 OFFICE O/P EST LOW 20 MIN: CPT

## 2023-01-02 RX ORDER — PREDNISONE 20 MG/1
40 TABLET ORAL DAILY
Qty: 10 TABLET | Refills: 0 | Status: SHIPPED | OUTPATIENT
Start: 2023-01-02 | End: 2023-01-07

## 2023-01-02 NOTE — ED INITIAL ASSESSMENT (HPI)
Friday, c/o productive cough that is getting worse. Using OTC meds with minimal relief. Denies fevers.

## 2023-01-02 NOTE — DISCHARGE INSTRUCTIONS
Take steroid as written. Restart Rhinocort. Continue antihistamine. Push clear fluids.   If you develop fever, shortness of breath or night sweats I recommend return for reevaluation

## 2023-02-01 DIAGNOSIS — J45.20 INTERMITTENT ASTHMA WITHOUT COMPLICATION, UNSPECIFIED ASTHMA SEVERITY: ICD-10-CM

## 2023-02-02 RX ORDER — ALBUTEROL SULFATE 90 UG/1
AEROSOL, METERED RESPIRATORY (INHALATION)
Qty: 1 EACH | Refills: 0 | Status: SHIPPED | OUTPATIENT
Start: 2023-02-02

## 2023-03-09 RX ORDER — LOSARTAN POTASSIUM 100 MG/1
TABLET ORAL
Qty: 45 TABLET | Refills: 0 | Status: SHIPPED | OUTPATIENT
Start: 2023-03-09

## 2023-03-17 ENCOUNTER — OFFICE VISIT (OUTPATIENT)
Dept: FAMILY MEDICINE CLINIC | Facility: CLINIC | Age: 58
End: 2023-03-17
Payer: COMMERCIAL

## 2023-03-17 VITALS
HEIGHT: 66 IN | WEIGHT: 180 LBS | HEART RATE: 84 BPM | OXYGEN SATURATION: 97 % | DIASTOLIC BLOOD PRESSURE: 86 MMHG | RESPIRATION RATE: 16 BRPM | BODY MASS INDEX: 28.93 KG/M2 | SYSTOLIC BLOOD PRESSURE: 110 MMHG

## 2023-03-17 DIAGNOSIS — N40.1 BENIGN PROSTATIC HYPERPLASIA WITH NOCTURIA: ICD-10-CM

## 2023-03-17 DIAGNOSIS — R35.1 BENIGN PROSTATIC HYPERPLASIA WITH NOCTURIA: ICD-10-CM

## 2023-03-17 DIAGNOSIS — E78.00 PURE HYPERCHOLESTEROLEMIA: ICD-10-CM

## 2023-03-17 DIAGNOSIS — N52.9 IMPOTENCE OF ORGANIC ORIGIN: ICD-10-CM

## 2023-03-17 DIAGNOSIS — I10 ESSENTIAL HYPERTENSION: ICD-10-CM

## 2023-03-17 DIAGNOSIS — R73.01 IMPAIRED FASTING GLUCOSE: ICD-10-CM

## 2023-03-17 DIAGNOSIS — J30.9 ALLERGIC RHINITIS, UNSPECIFIED SEASONALITY, UNSPECIFIED TRIGGER: ICD-10-CM

## 2023-03-17 DIAGNOSIS — J45.20 INTERMITTENT ASTHMA WITHOUT COMPLICATION, UNSPECIFIED ASTHMA SEVERITY: ICD-10-CM

## 2023-03-17 DIAGNOSIS — Z00.00 ROUTINE GENERAL MEDICAL EXAMINATION AT A HEALTH CARE FACILITY: Primary | ICD-10-CM

## 2023-03-17 PROCEDURE — 99396 PREV VISIT EST AGE 40-64: CPT | Performed by: FAMILY MEDICINE

## 2023-03-17 PROCEDURE — 3079F DIAST BP 80-89 MM HG: CPT | Performed by: FAMILY MEDICINE

## 2023-03-17 PROCEDURE — 3008F BODY MASS INDEX DOCD: CPT | Performed by: FAMILY MEDICINE

## 2023-03-17 PROCEDURE — 3074F SYST BP LT 130 MM HG: CPT | Performed by: FAMILY MEDICINE

## 2023-03-17 RX ORDER — TADALAFIL 20 MG/1
20 TABLET ORAL AS NEEDED
Qty: 24 TABLET | Refills: 3 | Status: SHIPPED | OUTPATIENT
Start: 2023-03-17

## 2023-03-17 NOTE — PATIENT INSTRUCTIONS
Try switching from the generic Claritin or loratadine to Allegra or fexofenadine. You may see some improvement in the postnasal drip. If prostate symptoms become annoying, we can add a second agent such as Flomax, Hytrin, Cardura, Rapaflo.

## 2023-03-26 LAB
ALBUMIN/GLOBULIN RATIO: 1.6 (CALC) (ref 1–2.5)
ALBUMIN: 4.4 G/DL (ref 3.6–5.1)
ALKALINE PHOSPHATASE: 59 U/L (ref 35–144)
ALT: 32 U/L (ref 9–46)
AST: 22 U/L (ref 10–35)
BILIRUBIN, TOTAL: 0.6 MG/DL (ref 0.2–1.2)
BUN: 15 MG/DL (ref 7–25)
CALCIUM: 9.4 MG/DL (ref 8.6–10.3)
CARBON DIOXIDE: 25 MMOL/L (ref 20–32)
CHLORIDE: 104 MMOL/L (ref 98–110)
CHOL/HDLC RATIO: 3.1 (CALC)
CHOLESTEROL, TOTAL: 140 MG/DL
CREATININE: 0.88 MG/DL (ref 0.7–1.3)
EGFR: 101 ML/MIN/1.73M2
GLOBULIN: 2.8 G/DL (CALC) (ref 1.9–3.7)
GLUCOSE: 97 MG/DL (ref 65–99)
HDL CHOLESTEROL: 45 MG/DL
HEMOGLOBIN A1C: 5.8 % OF TOTAL HGB
LDL-CHOLESTEROL: 75 MG/DL (CALC)
NON-HDL CHOLESTEROL: 95 MG/DL (CALC)
POTASSIUM: 4 MMOL/L (ref 3.5–5.3)
PROTEIN, TOTAL: 7.2 G/DL (ref 6.1–8.1)
PSA, TOTAL: 0.41 NG/ML
SODIUM: 138 MMOL/L (ref 135–146)
TRIGLYCERIDES: 123 MG/DL

## 2023-04-06 DIAGNOSIS — E78.00 PURE HYPERCHOLESTEROLEMIA: ICD-10-CM

## 2023-04-06 RX ORDER — ROSUVASTATIN CALCIUM 10 MG/1
TABLET, COATED ORAL
Qty: 90 TABLET | Refills: 0 | Status: SHIPPED | OUTPATIENT
Start: 2023-04-06

## 2023-06-14 RX ORDER — LOSARTAN POTASSIUM 100 MG/1
TABLET ORAL
Qty: 45 TABLET | Refills: 0 | Status: SHIPPED | OUTPATIENT
Start: 2023-06-14

## 2023-07-12 DIAGNOSIS — E78.00 PURE HYPERCHOLESTEROLEMIA: ICD-10-CM

## 2023-07-12 RX ORDER — ROSUVASTATIN CALCIUM 10 MG/1
10 TABLET, COATED ORAL NIGHTLY
Qty: 90 TABLET | Refills: 3 | Status: SHIPPED | OUTPATIENT
Start: 2023-07-12

## 2023-08-08 DIAGNOSIS — R35.1 BENIGN PROSTATIC HYPERPLASIA WITH NOCTURIA: ICD-10-CM

## 2023-08-08 DIAGNOSIS — N40.1 BENIGN PROSTATIC HYPERPLASIA WITH NOCTURIA: ICD-10-CM

## 2023-08-08 RX ORDER — FINASTERIDE 5 MG/1
5 TABLET, FILM COATED ORAL DAILY
Qty: 90 TABLET | Refills: 0 | Status: SHIPPED | OUTPATIENT
Start: 2023-08-08

## 2023-08-17 DIAGNOSIS — J45.20 INTERMITTENT ASTHMA WITHOUT COMPLICATION, UNSPECIFIED ASTHMA SEVERITY: ICD-10-CM

## 2023-08-18 RX ORDER — ALBUTEROL SULFATE 90 UG/1
AEROSOL, METERED RESPIRATORY (INHALATION)
Qty: 8.5 G | Refills: 1 | Status: SHIPPED | OUTPATIENT
Start: 2023-08-18

## 2023-09-27 RX ORDER — LOSARTAN POTASSIUM 100 MG/1
50 TABLET ORAL DAILY
Qty: 45 TABLET | Refills: 0 | Status: SHIPPED | OUTPATIENT
Start: 2023-09-27

## 2023-09-27 NOTE — TELEPHONE ENCOUNTER
A refill request was received for:  Requested Prescriptions     Pending Prescriptions Disp Refills    LOSARTAN 100 MG Oral Tab [Pharmacy Med Name: Losartan Potassium Oral Tablet 100 MG] 45 tablet 0     Sig: TAKE 1/2 TABLET BY MOUTH EVERY DAY       Last refill date: 6/14/2023      Last office visit:3/17/2023    Follow up due:  No future appointments.

## 2023-10-28 ENCOUNTER — IMMUNIZATION (OUTPATIENT)
Dept: LAB | Age: 58
End: 2023-10-28
Attending: EMERGENCY MEDICINE

## 2023-10-28 DIAGNOSIS — Z23 NEED FOR VACCINATION: Primary | ICD-10-CM

## 2023-10-28 PROCEDURE — 90686 IIV4 VACC NO PRSV 0.5 ML IM: CPT

## 2023-10-28 PROCEDURE — 90471 IMMUNIZATION ADMIN: CPT

## 2023-11-05 ENCOUNTER — IMMUNIZATION (OUTPATIENT)
Dept: LAB | Age: 58
End: 2023-11-05
Attending: EMERGENCY MEDICINE
Payer: COMMERCIAL

## 2023-11-05 DIAGNOSIS — Z23 NEED FOR VACCINATION: Primary | ICD-10-CM

## 2023-11-05 PROCEDURE — 90480 ADMN SARSCOV2 VAC 1/ONLY CMP: CPT

## 2023-12-05 DIAGNOSIS — J45.20 INTERMITTENT ASTHMA WITHOUT COMPLICATION, UNSPECIFIED ASTHMA SEVERITY: ICD-10-CM

## 2023-12-05 DIAGNOSIS — J30.1 NON-SEASONAL ALLERGIC RHINITIS DUE TO POLLEN: ICD-10-CM

## 2023-12-05 RX ORDER — MONTELUKAST SODIUM 10 MG/1
10 TABLET ORAL DAILY
Qty: 90 TABLET | Refills: 3 | Status: SHIPPED | OUTPATIENT
Start: 2023-12-05

## 2023-12-05 RX ORDER — HYDROCHLOROTHIAZIDE 12.5 MG/1
12.5 CAPSULE, GELATIN COATED ORAL DAILY
Qty: 90 CAPSULE | Refills: 3 | Status: SHIPPED | OUTPATIENT
Start: 2023-12-05

## 2023-12-05 NOTE — TELEPHONE ENCOUNTER
A refill request was received for:  Requested Prescriptions     Pending Prescriptions Disp Refills    MONTELUKAST 10 MG Oral Tab [Pharmacy Med Name: Montelukast Sodium 10 MG Oral Tablet] 90 tablet 3     Sig: TAKE 1 TABLET BY MOUTH DAILY    HYDROCHLOROTHIAZIDE 12.5 MG Oral Cap [Pharmacy Med Name: hydroCHLOROthiazide 12.5 MG Oral Capsule] 90 capsule 3     Sig: TAKE 1 CAPSULE BY MOUTH DAILY       Last refill date:10/27/2022       Last office visit: 3/17/2023    Follow up due:  No future appointments.

## 2024-01-05 RX ORDER — LOSARTAN POTASSIUM 100 MG/1
50 TABLET ORAL DAILY
Qty: 45 TABLET | Refills: 0 | Status: SHIPPED | OUTPATIENT
Start: 2024-01-05

## 2024-01-05 NOTE — TELEPHONE ENCOUNTER
.A refill request was received for:  Requested Prescriptions     Pending Prescriptions Disp Refills    LOSARTAN 100 MG Oral Tab [Pharmacy Med Name: Losartan Potassium Oral Tablet 100 MG] 45 tablet 0     Sig: TAKE 1/2 TABLET BY MOUTH EVERY DAY       Last refill date:   9/27/2023    Last office visit: 3/17/2023    Follow up due:  No future appointments.

## 2024-01-12 DIAGNOSIS — J45.20 INTERMITTENT ASTHMA WITHOUT COMPLICATION, UNSPECIFIED ASTHMA SEVERITY: ICD-10-CM

## 2024-01-12 RX ORDER — ALBUTEROL SULFATE 90 UG/1
AEROSOL, METERED RESPIRATORY (INHALATION)
Qty: 8.5 G | Refills: 0 | Status: SHIPPED | OUTPATIENT
Start: 2024-01-12

## 2024-01-12 NOTE — TELEPHONE ENCOUNTER
A refill request was received for:  Requested Prescriptions     Pending Prescriptions Disp Refills    ALBUTEROL 108 (90 Base) MCG/ACT Inhalation Aero Soln [Pharmacy Med Name: Albuterol Sulfate HFA Inhalation Aerosol Solution 108 (90 Base) MCG/ACT] 8.5 g 0     Sig: INHALE 2 PUFFS BY MOUTH EVERY 4 TO 6 HOURS AS NEEDED FOR WHEEZING OR SHORTNESS OF BREATH.       Last refill date:   8-18-23    Last office visit: 3-17-23    Follow up due:  No future appointments.

## 2024-01-23 DIAGNOSIS — J45.20 INTERMITTENT ASTHMA WITHOUT COMPLICATION, UNSPECIFIED ASTHMA SEVERITY: ICD-10-CM

## 2024-01-24 RX ORDER — ALBUTEROL SULFATE 90 UG/1
AEROSOL, METERED RESPIRATORY (INHALATION)
Qty: 8.5 G | Refills: 0 | Status: SHIPPED | OUTPATIENT
Start: 2024-01-24

## 2024-04-05 RX ORDER — LOSARTAN POTASSIUM 100 MG/1
50 TABLET ORAL DAILY
Qty: 45 TABLET | Refills: 0 | Status: SHIPPED | OUTPATIENT
Start: 2024-04-05

## 2024-04-05 NOTE — TELEPHONE ENCOUNTER
A refill request was received for:  Requested Prescriptions     Pending Prescriptions Disp Refills    LOSARTAN 100 MG Oral Tab [Pharmacy Med Name: Losartan Potassium Oral Tablet 100 MG] 45 tablet 0     Sig: TAKE 1/2 TABLET BY MOUTH EVERY DAY       Last refill date:1/5/2024     No CMP in last 12 months   Last office visit: 3/17/2023    Follow up due:  Future Appointments   Date Time Provider Department Center   5/28/2024  8:00 AM Unruly Santamaria MD [facility] [address]

## 2024-06-03 ENCOUNTER — OFFICE VISIT (OUTPATIENT)
Dept: FAMILY MEDICINE CLINIC | Facility: CLINIC | Age: 59
End: 2024-06-03
Payer: COMMERCIAL

## 2024-06-03 VITALS
HEART RATE: 68 BPM | SYSTOLIC BLOOD PRESSURE: 120 MMHG | WEIGHT: 177 LBS | HEIGHT: 66 IN | BODY MASS INDEX: 28.45 KG/M2 | OXYGEN SATURATION: 94 % | RESPIRATION RATE: 16 BRPM | DIASTOLIC BLOOD PRESSURE: 68 MMHG

## 2024-06-03 DIAGNOSIS — N40.1 BENIGN PROSTATIC HYPERPLASIA WITH NOCTURIA: ICD-10-CM

## 2024-06-03 DIAGNOSIS — E78.00 PURE HYPERCHOLESTEROLEMIA: ICD-10-CM

## 2024-06-03 DIAGNOSIS — Z00.00 ROUTINE GENERAL MEDICAL EXAMINATION AT A HEALTH CARE FACILITY: Primary | ICD-10-CM

## 2024-06-03 DIAGNOSIS — R35.1 BENIGN PROSTATIC HYPERPLASIA WITH NOCTURIA: ICD-10-CM

## 2024-06-03 DIAGNOSIS — I10 ESSENTIAL HYPERTENSION: ICD-10-CM

## 2024-06-03 DIAGNOSIS — J45.20 INTERMITTENT ASTHMA WITHOUT COMPLICATION, UNSPECIFIED ASTHMA SEVERITY (HCC): ICD-10-CM

## 2024-06-03 DIAGNOSIS — R73.01 IMPAIRED FASTING GLUCOSE: ICD-10-CM

## 2024-06-03 PROCEDURE — 99396 PREV VISIT EST AGE 40-64: CPT | Performed by: FAMILY MEDICINE

## 2024-06-03 RX ORDER — ALBUTEROL SULFATE 90 UG/1
AEROSOL, METERED RESPIRATORY (INHALATION)
Qty: 8.5 G | Refills: 1 | Status: SHIPPED | OUTPATIENT
Start: 2024-06-03

## 2024-06-03 NOTE — PATIENT INSTRUCTIONS
Stop hydrochlorothiazide due to urine frequency.  Before 1 month is over let me know how the urine is going.  If we need to increase losartan to the full 100 mg tablet we can place the next refill for 90 tablets instead of 45.  Check blood pressures twice a week.    If urine frequency does not improve, we can switch you off of the finasteride and instead use Hytrin, Cardura, Rapaflo, or Flomax.  These are alpha blockers that help increase the urine flow through the prostate gland.

## 2024-06-03 NOTE — PROGRESS NOTES
HPI:  Here for a physical.    PAST MEDICAL HISTORY:  Past Medical History:    Calculus of gallbladder without mention of cholecystitis or obstruction    Chronic rhinitis    Extrinsic asthma, unspecified    ALLERGY INDUCED ASTHMA    Hx of endoscopy     PAST SURGICAL HISTORY:  Past Surgical History:   Procedure Laterality Date    Anesth,achilles tendon surg  1/1/01    right    Cholecystectomy       MEDICATIONS:  Current Outpatient Medications   Medication Sig Dispense Refill    albuterol 108 (90 Base) MCG/ACT Inhalation Aero Soln INHALE 2 PUFFS BY MOUTH EVERY 4 TO 6 HOURS AS NEEDED FOR WHEEZING OR SHORTNESS OF BREATH. 8.5 g 1    losartan 100 MG Oral Tab Take 0.5 tablets (50 mg total) by mouth daily. 45 tablet 0    montelukast 10 MG Oral Tab Take 1 tablet (10 mg total) by mouth daily. 90 tablet 3    finasteride 5 MG Oral Tab Take 1 tablet (5 mg total) by mouth daily. 90 tablet 0    rosuvastatin 10 MG Oral Tab TAKE 1 TABLET BY MOUTH EVERY DAY AT NIGHT 90 tablet 3    Fexofenadine HCl 30 MG Oral Tablet Dispersible Take 1 tablet (30 mg total) by mouth daily. 1 tablet 0    Tadalafil (CIALIS) 20 MG Oral Tab Take 1 tablet (20 mg total) by mouth as needed for Erectile Dysfunction. 24 tablet 3     ALLERGIES: Dust, Grass, Mold, and Ragweed    Family History   Problem Relation Age of Onset    Allergies Father     Hypertension Mother     Allergies Daughter     Allergies Son     Asthma Brother     Cancer Neg     Heart Disease Neg     Stroke Neg        Social History     Socioeconomic History    Marital status:    Tobacco Use    Smoking status: Some Days     Types: Cigarettes    Smokeless tobacco: Never    Tobacco comments:     socially   Vaping Use    Vaping status: Never Used   Substance and Sexual Activity    Alcohol use: Yes     Comment: 3 TIMES PER YEAR    Drug use: No   Other Topics Concern    Caffeine Concern Yes    Exercise No       REVIEW OF SYSTEMS:  GENERAL: Feeling generally well.  GENITOURINARY: Urine frequency.   Thought it could be due to taking the Proscar.  He is also on hydrochlorothiazide.    EXAM:  /68   Pulse 68   Resp 16   Ht 5' 6\" (1.676 m)   Wt 177 lb (80.3 kg)   SpO2 94%   BMI 28.57 kg/m²   NAD  GENERAL: well developed, well nourished, in no apparent distress.  EARS: Bilateral pinna / tragus are WNL in appearance, External canals patent and without inflammation. Bilateral tympanic membranes white. No effusions noted. Hearing grossly normal.  EYES: PERRLA, EOMI, bilateral sclera anicteric, non-injected. Conjunctiva pink.  NOSE: Mucosa appears healthy.   OROPHARYNX: Mucosa moist without lesions. Dentition intact and gums appear healthy.  NECK: Supple, No lymphadenopathy. No thyromegaly or lesions palpated.  CARDIOVASCULAR: RRR, NL S1 and S2, no murmurs. Bilateral carotids without bruit. No abdominal bruits auscultated. Bilateral dorsalis pedis and posterior tibial pulses 2+/4+. No edema of the bilateral lower extremities. No JVD noted.  PULMONARY: CTA bilaterally, good air exchange, no rhonchi, wheezes, or rales. No dullness to percussion.  ABDOMEN: Soft, nontender, nondistended, NABS x 4 quadrants. No HSM; no masses; no bruits.   GENITOURINARY: No hernia.    No urethral discharge.  Chaperone offered and declined for hernia and rectal exam  RECTAL: No rectal masses; prostate smooth, nontender; no nodules; nonenlarged; stool brown.   LYMPHATIC: no lymphadenopathy palpated about the anterior and posterior cervical chains, submandibular and supraclavicular areas, and inguinal areas.  EXTREMITIES / MUSCULOSKELETAL: 4 extremities with grossly normal ROM. Gait NL. No cyanosis, clubbing or edema.  NEUROLOGIC: CN's II-XII grossly intact, DTR's 2+/4+ throughout. Strength 5+/5+ x 4 ext. Alert and orientated.  SKIN: no suspicions lesions noted.  Large cyst with concretion in the front of the chest.  Multiple skin tags around the neck.  Dermatofibroma on the knee.  PSYCHIATRIC: Mood and affect  appropriate.      ASSESSMENT / PLAN:  Routine health maintenance.  Labs ordered: see orders.  Discussed USPTFS recommendations against prostate cancer screening as of May 2012.  Tetanus booster : 2020.  Shingles vaccine if over 50. 2 doses of Shingrix 2 to 6 months apart. Less expensive to obtain from pharmacy. completed  [facility] information discussed: colon cancer screening: utd  Additional issues: 1. Intermittent asthma without complication, unspecified asthma severity (HCC)  On montelukast for prevention  - albuterol 108 (90 Base) MCG/ACT Inhalation Aero Soln; INHALE 2 PUFFS BY MOUTH EVERY 4 TO 6 HOURS AS NEEDED FOR WHEEZING OR SHORTNESS OF BREATH.  Dispense: 8.5 g; Refill: 1    3. Essential hypertension  Discontinue hydrochlorothiazide due to urine frequency.  Check blood pressure twice a week.  If greater than 140/90 increase losartan to 100 mg.  - Comp Metabolic Panel (14)    4. Pure hypercholesterolemia    - Comp Metabolic Panel (14)  - Lipid Panel    5. Benign prostatic hyperplasia with nocturia  If the urine frequency does not improve in the next month, we will stop the Proscar and change to an alpha-blocker.  He will message me towards the end of June with an update on the urine frequency.  - PSA Total, Diagnostic    6. Impaired fasting glucose    - Hemoglobin A1C       Patient understood above plan and agreed to do labs within the next 30 days as well as to make all appointments for referrals if given within the next 30 days. Patient understands to contact office if unable to do so.

## 2024-06-06 LAB
ALBUMIN/GLOBULIN RATIO: 1.5 (CALC) (ref 1–2.5)
ALBUMIN: 4.4 G/DL (ref 3.6–5.1)
ALKALINE PHOSPHATASE: 59 U/L (ref 35–144)
ALT: 23 U/L (ref 9–46)
AST: 19 U/L (ref 10–35)
BILIRUBIN, TOTAL: 0.5 MG/DL (ref 0.2–1.2)
BUN: 18 MG/DL (ref 7–25)
CALCIUM: 9.5 MG/DL (ref 8.6–10.3)
CARBON DIOXIDE: 26 MMOL/L (ref 20–32)
CHLORIDE: 105 MMOL/L (ref 98–110)
CHOL/HDLC RATIO: 4 (CALC)
CHOLESTEROL, TOTAL: 153 MG/DL
CREATININE: 0.92 MG/DL (ref 0.7–1.3)
EGFR: 97 ML/MIN/1.73M2
GLOBULIN: 3 G/DL (CALC) (ref 1.9–3.7)
GLUCOSE: 96 MG/DL (ref 65–99)
HDL CHOLESTEROL: 38 MG/DL
HEMOGLOBIN A1C: 6.1 % OF TOTAL HGB
LDL-CHOLESTEROL: 90 MG/DL (CALC)
NON-HDL CHOLESTEROL: 115 MG/DL (CALC)
POTASSIUM: 4.5 MMOL/L (ref 3.5–5.3)
PROTEIN, TOTAL: 7.4 G/DL (ref 6.1–8.1)
PSA, TOTAL: 0.83 NG/ML
SODIUM: 135 MMOL/L (ref 135–146)
TRIGLYCERIDES: 156 MG/DL

## 2024-07-01 RX ORDER — LOSARTAN POTASSIUM 100 MG/1
50 TABLET ORAL DAILY
Qty: 45 TABLET | Refills: 0 | Status: SHIPPED | OUTPATIENT
Start: 2024-07-01

## 2024-07-15 DIAGNOSIS — R35.1 BENIGN PROSTATIC HYPERPLASIA WITH NOCTURIA: ICD-10-CM

## 2024-07-15 DIAGNOSIS — N40.1 BENIGN PROSTATIC HYPERPLASIA WITH NOCTURIA: ICD-10-CM

## 2024-07-15 RX ORDER — FINASTERIDE 5 MG/1
5 TABLET, FILM COATED ORAL DAILY
Qty: 90 TABLET | Refills: 0 | Status: SHIPPED | OUTPATIENT
Start: 2024-07-15

## 2024-07-18 DIAGNOSIS — E78.00 PURE HYPERCHOLESTEROLEMIA: ICD-10-CM

## 2024-07-18 RX ORDER — ROSUVASTATIN CALCIUM 10 MG/1
10 TABLET, COATED ORAL NIGHTLY
Qty: 90 TABLET | Refills: 3 | Status: SHIPPED | OUTPATIENT
Start: 2024-07-18

## 2024-10-15 DIAGNOSIS — R35.1 BENIGN PROSTATIC HYPERPLASIA WITH NOCTURIA: ICD-10-CM

## 2024-10-15 DIAGNOSIS — N40.1 BENIGN PROSTATIC HYPERPLASIA WITH NOCTURIA: ICD-10-CM

## 2024-10-15 RX ORDER — FINASTERIDE 5 MG/1
5 TABLET, FILM COATED ORAL DAILY
Qty: 90 TABLET | Refills: 0 | Status: SHIPPED | OUTPATIENT
Start: 2024-10-15

## 2024-10-15 NOTE — TELEPHONE ENCOUNTER
A refill request was received for:  Requested Prescriptions      No prescriptions requested or ordered in this encounter       Last refill date:7/2024       Last office visit: 6/3/2024 cz    Follow up due:  No future appointments.

## 2024-10-18 NOTE — TELEPHONE ENCOUNTER
A refill request was received for:  Requested Prescriptions     Pending Prescriptions Disp Refills    losartan 100 MG Oral Tab 45 tablet 0     Sig: Take 0.5 tablets (50 mg total) by mouth daily.       Last refill date:  07/01/24     Last office visit: 06/03/24      No future appointments.

## 2024-10-22 RX ORDER — LOSARTAN POTASSIUM 100 MG/1
50 TABLET ORAL DAILY
Qty: 45 TABLET | Refills: 0 | Status: SHIPPED | OUTPATIENT
Start: 2024-10-22

## 2024-12-18 DIAGNOSIS — I10 ESSENTIAL HYPERTENSION: ICD-10-CM

## 2024-12-19 RX ORDER — AMLODIPINE BESYLATE 2.5 MG/1
2.5 TABLET ORAL DAILY
Qty: 90 TABLET | Refills: 0 | Status: SHIPPED | OUTPATIENT
Start: 2024-12-19

## 2025-01-11 DIAGNOSIS — N40.1 BENIGN PROSTATIC HYPERPLASIA WITH NOCTURIA: ICD-10-CM

## 2025-01-11 DIAGNOSIS — R35.1 BENIGN PROSTATIC HYPERPLASIA WITH NOCTURIA: ICD-10-CM

## 2025-01-11 RX ORDER — FINASTERIDE 5 MG/1
5 TABLET, FILM COATED ORAL DAILY
Qty: 90 TABLET | Refills: 0 | Status: SHIPPED | OUTPATIENT
Start: 2025-01-11

## 2025-01-14 RX ORDER — LOSARTAN POTASSIUM 100 MG/1
50 TABLET ORAL DAILY
Qty: 45 TABLET | Refills: 0 | Status: SHIPPED | OUTPATIENT
Start: 2025-01-14

## 2025-01-20 DIAGNOSIS — J45.20 INTERMITTENT ASTHMA WITHOUT COMPLICATION, UNSPECIFIED ASTHMA SEVERITY (HCC): ICD-10-CM

## 2025-01-20 DIAGNOSIS — J30.1 NON-SEASONAL ALLERGIC RHINITIS DUE TO POLLEN: ICD-10-CM

## 2025-01-20 NOTE — TELEPHONE ENCOUNTER
A refill request was received for:  Requested Prescriptions     Pending Prescriptions Disp Refills    montelukast 10 MG Oral Tab 90 tablet 3     Sig: Take 1 tablet (10 mg total) by mouth daily.       Last refill date:12/5/2023       Last office visit: 6/2024    Follow up due:  No future appointments.

## 2025-01-21 RX ORDER — MONTELUKAST SODIUM 10 MG/1
10 TABLET ORAL DAILY
Qty: 90 TABLET | Refills: 3 | Status: SHIPPED | OUTPATIENT
Start: 2025-01-21

## 2025-03-16 DIAGNOSIS — I10 ESSENTIAL HYPERTENSION: ICD-10-CM

## 2025-03-17 RX ORDER — AMLODIPINE BESYLATE 2.5 MG/1
2.5 TABLET ORAL DAILY
Qty: 30 TABLET | Refills: 0 | Status: SHIPPED | OUTPATIENT
Start: 2025-03-17

## 2025-04-09 DIAGNOSIS — R35.1 BENIGN PROSTATIC HYPERPLASIA WITH NOCTURIA: ICD-10-CM

## 2025-04-09 DIAGNOSIS — N40.1 BENIGN PROSTATIC HYPERPLASIA WITH NOCTURIA: ICD-10-CM

## 2025-04-09 RX ORDER — FINASTERIDE 5 MG/1
5 TABLET, FILM COATED ORAL DAILY
Qty: 90 TABLET | Refills: 0 | Status: SHIPPED | OUTPATIENT
Start: 2025-04-09 | End: 2025-07-07

## 2025-04-12 RX ORDER — LOSARTAN POTASSIUM 100 MG/1
50 TABLET ORAL DAILY
Qty: 45 TABLET | Refills: 0 | OUTPATIENT
Start: 2025-04-12

## 2025-04-13 DIAGNOSIS — I10 ESSENTIAL HYPERTENSION: ICD-10-CM

## 2025-04-14 RX ORDER — AMLODIPINE BESYLATE 2.5 MG/1
2.5 TABLET ORAL DAILY
Qty: 30 TABLET | Refills: 0 | Status: SHIPPED | OUTPATIENT
Start: 2025-04-14 | End: 2025-05-12

## 2025-04-28 RX ORDER — LOSARTAN POTASSIUM 100 MG/1
50 TABLET ORAL DAILY
Qty: 45 TABLET | Refills: 0 | Status: SHIPPED | OUTPATIENT
Start: 2025-04-28

## 2025-05-11 DIAGNOSIS — I10 ESSENTIAL HYPERTENSION: ICD-10-CM

## 2025-05-12 RX ORDER — AMLODIPINE BESYLATE 2.5 MG/1
2.5 TABLET ORAL DAILY
Qty: 30 TABLET | Refills: 0 | Status: SHIPPED | OUTPATIENT
Start: 2025-05-12

## 2025-06-06 ENCOUNTER — OFFICE VISIT (OUTPATIENT)
Dept: FAMILY MEDICINE CLINIC | Facility: CLINIC | Age: 60
End: 2025-06-06
Payer: COMMERCIAL

## 2025-06-06 VITALS
SYSTOLIC BLOOD PRESSURE: 122 MMHG | HEART RATE: 74 BPM | BODY MASS INDEX: 28.61 KG/M2 | WEIGHT: 178 LBS | OXYGEN SATURATION: 97 % | RESPIRATION RATE: 17 BRPM | HEIGHT: 66 IN | DIASTOLIC BLOOD PRESSURE: 68 MMHG

## 2025-06-06 DIAGNOSIS — R35.1 BPH ASSOCIATED WITH NOCTURIA: ICD-10-CM

## 2025-06-06 DIAGNOSIS — Z00.00 ANNUAL PHYSICAL EXAM: Primary | ICD-10-CM

## 2025-06-06 DIAGNOSIS — R73.03 PREDIABETES: ICD-10-CM

## 2025-06-06 DIAGNOSIS — E55.9 VITAMIN D DEFICIENCY: ICD-10-CM

## 2025-06-06 DIAGNOSIS — N40.1 BPH ASSOCIATED WITH NOCTURIA: ICD-10-CM

## 2025-06-06 PROCEDURE — 99396 PREV VISIT EST AGE 40-64: CPT | Performed by: FAMILY MEDICINE

## 2025-06-06 RX ORDER — TAMSULOSIN HYDROCHLORIDE 0.4 MG/1
0.4 CAPSULE ORAL DAILY
Qty: 90 CAPSULE | Refills: 3 | Status: SHIPPED | OUTPATIENT
Start: 2025-06-06

## 2025-06-06 NOTE — H&P
The 21st Century Cures Act makes medical notes like these available to patients in the interest of transparency. Please be advised this is a medical document. Medical documents are intended to carry relevant information, facts as evident, and the clinical opinion of the practitioner. The medical note is intended as peer to peer communication and may appear blunt or direct. It is written in medical language and may contain abbreviations or verbiage that are unfamiliar.   Brandin Curry is a 60 year old male who presents for a complete physical exam.       HPI:   Occasional smoking + cigar and cigarettes, when playing golf.    Prediabetic, Last A1c value was 6.1% done 6/5/2024.    Frequent urination, nocturia has to wake up for 2 to 3 times at night.    Colonoscopy: 08/17/2017    Current Medications[1]   Past Medical History[2]   Past Surgical History[3]   Family History[4]   Social History:  Short Social Hx on File[5]      REVIEW OF SYSTEMS:   GENERAL: feels well otherwise  SKIN: denies any unusual skin lesions or rash  EYES:denies blurred vision or double vision  HEENT: denies nasal congestion, sinus pain or ST, denies decreased hearing  LUNGS: denies shortness of breath or frequent cough  CV: denies chest pain, pressure or palpitations  GI: denies abdominal pain, heartburn, chronic diarrhea or constipation  : denies nocturia or changes in stream, denies erectile dysfunction  MS: denies back pain, myalgias or arthralgias  NEURO: denies headaches or dizziness  PSYCH: denies depression or anxiety  HEMATOLOGIC: denies bruising or bleeding easily  ENDOCRINE: denies frequent thirst or urination, denies unintentional weight gain/loss    EXAM:   /68 (BP Location: Left arm, Patient Position: Sitting, Cuff Size: adult)   Pulse 74   Resp 17   Ht 5' 6\" (1.676 m)   Wt 178 lb (80.7 kg)   SpO2 97%   BMI 28.73 kg/m²       Body mass index is 28.73 kg/m².     GENERAL: well developed, well nourished,in no apparent  distress  SKIN: no rashes,no suspicious lesions  HEENT: atraumatic, normocephalic,TMs clear, posterior pharynx clear  EYES: PERRLA,conjunctiva clear  NECK: supple,no thyromegaly, no adenopathy  LUNGS: clear to auscultation, easy breathing, no cough  CV: S1 S2 noted, RRR, no murmur  GI: active bowel sounds, no rebound/rigidity/tenderness, no HSM  : 2 descended testes, no scrotal mass or tenderness, normal penis no lesions, no hernia  MS: N[place], good tone & strength  EXT: no cyanosis, clubbing or edema  NEURO: Alert & oriented x 3, LEs +2DTRs  PSYCH: Mood and affect appropriate    ASSESSMENT AND PLAN:   Brandin Curry is a 60 year old male who presents for a complete physical exam. Heart healthy diet, routine exercise, and annual flu vaccines encouraged.  The patient indicates understanding of these issues and agrees to the plan.    1. Annual physical exam  - Lipid Panel  - CBC With Differential With Platelet  - Comp Metabolic Panel (14)  - TSH W Reflex To Free T4  - Hemoglobin A1C  - VITAMIN D, 25-HYDROXY [62201][Q]  - PSA - Total [5363][Q]    2. Vitamin D deficiency  - VITAMIN D, 25-HYDROXY [79599][Q]    3. Prediabetes  - Hemoglobin A1C    4. BPH associated with nocturia  - tamsulosin 0.4 MG Oral Cap; Take 1 capsule (0.4 mg total) by mouth daily.  Dispense: 90 capsule; Refill: 3    Orders Placed This Encounter   Procedures    Lipid Panel    CBC With Differential With Platelet    Comp Metabolic Panel (14)    TSH W Reflex To Free T4    Hemoglobin A1C    VITAMIN D, 25-HYDROXY [[id number]][Q]     Release to patient:   Immediate    PSA - Total [5363][Q]     Release to patient:   Immediate     Follow up in 6 months.    Note written by matt.  Matt is in the room with me.  Matt has written note according to my dictation.  Reviewed scribe note.  Changed as appropriate.         [1]   Current Outpatient Medications   Medication Sig Dispense Refill    tamsulosin 0.4 MG Oral Cap Take 1 capsule (0.4 mg total) by mouth daily. 90  capsule 3    AMLODIPINE 2.5 MG Oral Tab TAKE 1 TABLET BY MOUTH EVERY DAY 30 tablet 0    LOSARTAN 100 MG Oral Tab TAKE 1/2 TABLET BY MOUTH EVERY DAY 45 tablet 0    FINASTERIDE 5 MG Oral Tab TAKE 1 TABLET BY MOUTH EVERY DAY 90 tablet 0    montelukast 10 MG Oral Tab Take 1 tablet (10 mg total) by mouth daily. 90 tablet 3    rosuvastatin 10 MG Oral Tab Take 1 tablet (10 mg total) by mouth nightly. 90 tablet 3    albuterol 108 (90 Base) MCG/ACT Inhalation Aero Soln INHALE 2 PUFFS BY MOUTH EVERY 4 TO 6 HOURS AS NEEDED FOR WHEEZING OR SHORTNESS OF BREATH. 8.5 g 1    Fexofenadine HCl 30 MG Oral Tablet Dispersible Take 1 tablet (30 mg total) by mouth daily. 1 tablet 0    Tadalafil (CIALIS) 20 MG Oral Tab Take 1 tablet (20 mg total) by mouth as needed for Erectile Dysfunction. 24 tablet 3   [2]   Past Medical History:   Calculus of gallbladder without mention of cholecystitis or obstruction    Chronic rhinitis    Extrinsic asthma, unspecified    ALLERGY INDUCED ASTHMA    Hx of endoscopy   [3]   Past Surgical History:  Procedure Laterality Date    Anesth,achilles tendon surg  1/1/01    right    Cholecystectomy     [4]   Family History  Problem Relation Age of Onset    Allergies Father     Hypertension Mother     Allergies Daughter     Allergies Son     Asthma Brother     Cancer Neg     Heart Disease Neg     Stroke Neg    [5]   Social History  Socioeconomic History    Marital status:    Tobacco Use    Smoking status: Some Days     Types: Cigarettes    Smokeless tobacco: Never    Tobacco comments:     socially   Vaping Use    Vaping status: Never Used   Substance and Sexual Activity    Alcohol use: Yes     Comment: Socially    Drug use: No   Other Topics Concern    Caffeine Concern Yes    Exercise No     Social Drivers of Health     Food Insecurity: No Food Insecurity (6/6/2025)    NCSS - Food Insecurity     Worried About Running Out of Food in the Last Year: No     Ran Out of Food in the Last Year: No   Transportation  Needs: No Transportation Needs (6/6/2025)    NCSS - Transportation     Lack of Transportation: No   Housing Stability: Not At Risk (6/6/2025)    NCSS - Housing/Utilities     Has Housing: Yes     Worried About Losing Housing: No     Unable to Get Utilities: No

## 2025-06-15 LAB
ABSOLUTE BASOPHILS: 62 CELLS/UL (ref 0–200)
ABSOLUTE EOSINOPHILS: 490 CELLS/UL (ref 15–500)
ABSOLUTE LYMPHOCYTES: 2167 CELLS/UL (ref 850–3900)
ABSOLUTE MONOCYTES: 511 CELLS/UL (ref 200–950)
ABSOLUTE NEUTROPHILS: 3671 CELLS/UL (ref 1500–7800)
ALBUMIN/GLOBULIN RATIO: 1.5 (CALC) (ref 1–2.5)
ALBUMIN: 4.6 G/DL (ref 3.6–5.1)
ALKALINE PHOSPHATASE: 79 U/L (ref 35–144)
ALT: 25 U/L (ref 9–46)
AST: 20 U/L (ref 10–35)
BASOPHILS: 0.9 %
BILIRUBIN, TOTAL: 0.5 MG/DL (ref 0.2–1.2)
BUN: 17 MG/DL (ref 7–25)
CALCIUM: 9.6 MG/DL (ref 8.6–10.3)
CARBON DIOXIDE: 23 MMOL/L (ref 20–32)
CHLORIDE: 105 MMOL/L (ref 98–110)
CHOL/HDLC RATIO: 3.3 (CALC)
CHOLESTEROL, TOTAL: 149 MG/DL
CREATININE: 0.89 MG/DL (ref 0.7–1.3)
EGFR: 99 ML/MIN/1.73M2
EOSINOPHILS: 7.1 %
GLOBULIN: 3 G/DL (CALC) (ref 1.9–3.7)
GLUCOSE: 106 MG/DL (ref 65–99)
HDL CHOLESTEROL: 45 MG/DL
HEMATOCRIT: 49.6 % (ref 38.5–50)
HEMOGLOBIN A1C: 6 %
HEMOGLOBIN: 16.5 G/DL (ref 13.2–17.1)
LDL-CHOLESTEROL: 87 MG/DL (CALC)
LYMPHOCYTES: 31.4 %
MCH: 31.1 PG (ref 27–33)
MCHC: 33.3 G/DL (ref 32–36)
MCV: 93.4 FL (ref 80–100)
MONOCYTES: 7.4 %
MPV: 9.3 FL (ref 7.5–12.5)
NEUTROPHILS: 53.2 %
NON-HDL CHOLESTEROL: 104 MG/DL (CALC)
PLATELET COUNT: 323 THOUSAND/UL (ref 140–400)
POTASSIUM: 4.1 MMOL/L (ref 3.5–5.3)
PROTEIN, TOTAL: 7.6 G/DL (ref 6.1–8.1)
PSA, TOTAL: 0.64 NG/ML
RDW: 13.1 % (ref 11–15)
RED BLOOD CELL COUNT: 5.31 MILLION/UL (ref 4.2–5.8)
SODIUM: 137 MMOL/L (ref 135–146)
TRIGLYCERIDES: 81 MG/DL
TSH W/REFLEX TO FT4: 1.67 MIU/L (ref 0.4–4.5)
VITAMIN D, 25-OH, TOTAL: 26 NG/ML (ref 30–100)
WHITE BLOOD CELL COUNT: 6.9 THOUSAND/UL (ref 3.8–10.8)

## 2025-07-06 DIAGNOSIS — R35.1 BENIGN PROSTATIC HYPERPLASIA WITH NOCTURIA: ICD-10-CM

## 2025-07-06 DIAGNOSIS — N40.1 BENIGN PROSTATIC HYPERPLASIA WITH NOCTURIA: ICD-10-CM

## 2025-07-07 RX ORDER — FINASTERIDE 5 MG/1
5 TABLET, FILM COATED ORAL DAILY
Qty: 90 TABLET | Refills: 0 | Status: SHIPPED | OUTPATIENT
Start: 2025-07-07

## 2025-08-04 RX ORDER — LOSARTAN POTASSIUM 100 MG/1
50 TABLET ORAL DAILY
Qty: 45 TABLET | Refills: 0 | Status: SHIPPED | OUTPATIENT
Start: 2025-08-04

## (undated) DIAGNOSIS — E78.00 PURE HYPERCHOLESTEROLEMIA: Primary | ICD-10-CM

## (undated) DIAGNOSIS — J45.20 INTERMITTENT ASTHMA WITHOUT COMPLICATION, UNSPECIFIED ASTHMA SEVERITY: ICD-10-CM

## (undated) NOTE — LETTER
ASTHMA ACTION PLAN for Brandin MARTIN Ico     : 3/13/1965          Date: 2019    Dominick Chamorro MD  [facility], [address] [place] [id number]  [phone] 1.   [employer] GO!  % [employer] [] Asthma Action Plan reviewed with patient (and caregiver if necessary) on the phone and mailed copy to patient.          Physician Patient Caretaker (if necessary)   Cindy Monteiro MD  [address] [place] [address]

## (undated) NOTE — LETTER
Date: 2/12/2021    Patient Name: Michael Humphrey          To Whom it may concern: The above patient was seen at the [facility] for treatment of a medical condition.     This patient should be excused from attending work on 02/05/21, 02/11/21, 02

## (undated) NOTE — LETTER
ASTHMA ACTION PLAN for Brandin MARTIN Ico     : 3/13/1965     Date: 2018  Provider:  Tanika Ricardo DO  Phone for doctor or clinic: [address] [place] [address] [address] [place] [address]  [phone]

## (undated) NOTE — LETTER
Date: 2/26/2021    Patient Name: Rosalina Ponce          To Whom it may concern: This letter has been written at the patient's request. The above patient was seen at the [facility] for treatment of a medical condition.     This patient should b

## (undated) NOTE — LETTER
Date & Time: 2/16/2021, 3:02 PM  Patient: Anita Gunn  Encounter Provider(s):    YANNICK Zhao       To Whom It May Concern:    Brandin Curry was seen and treated in our department on 2/16/2021.  He may not return to work before 10 days from this date an

## (undated) NOTE — LETTER
ASTHMA ACTION PLAN for Brandin MARTIN Ico     : 3/13/1965     Date: 2020  Provider:  Dominick Chamorro MD  Phone for doctor or clinic: [facility], [place] [address], [place] [address], [address]  [phone]

## (undated) NOTE — LETTER
Date & Time: 5/2/2022, 7:33 PM  Patient: Angel Buckner  Encounter Provider(s):    Crow Hernandez DO       To Whom It May Concern:    Brandin Curry was seen and treated in our department on 5/2/2022. He should not return to work until 05/08/22.   If asymptomatic, patient can then return with a mask and would not need follow-up/repeat confirmatory testing    If you have any questions or concerns, please do not hesitate to call.        _____________________________  Physician/APC Signature

## (undated) NOTE — LETTER
ASTHMA ACTION PLAN for Brandin MARTIN farida     : 3/13/1965     Date: 25  Doctor:  Ramonita Stovall DO  Phone for doctor or clinic: [facility], [address] [place] [id number] [phone]      ACT Score: 23    ACT Goal: 20 or greater    Call your provider if you require your rescue/quick reliever medication more than 2-3 times in a 24 hour period.    If you require your rescue inhaler/medication more than 2-3 times weekly, your asthma may not be under proper control and you should seek medical attention.    *Quick Relievers are Xopenex and Albuterol*    You can use the colors of a traffic light to help learn about your asthma medicines.  Year Round       1. Green - Go! % of Personal Best Peak Flow   Use controller medicine.   Breathing is good  No cough or wheeze  Can work and play Medicine How much to take When to take it    Medications       Leukotriene Modulators Instructions     montelukast 10 MG Oral Tab Take 1 tablet (10 mg total) by mouth daily.       Sympathomimetics Instructions     albuterol 108 (90 Base) MCG/ACT Inhalation Aero Soln INHALE 2 PUFFS BY MOUTH EVERY 4 TO 6 HOURS AS NEEDED FOR WHEEZING OR SHORTNESS OF BREATH.                    2. Yellow - Caution. 50-79% Personal Best Peak Flow  Use reliever medicine to keep an asthma attack from getting bad.   Cough  Quick Relievers  Wheezing  Tight Chest  Wake up at night Medicine How much to take When to take it    If symptoms are not improving in 24-48 hrs, call office for further instructions  Medications       Leukotriene Modulators Instructions     montelukast 10 MG Oral Tab Take 1 tablet (10 mg total) by mouth daily.       Sympathomimetics Instructions     albuterol 108 (90 Base) MCG/ACT Inhalation Aero Soln INHALE 2 PUFFS BY MOUTH EVERY 4 TO 6 HOURS AS NEEDED FOR WHEEZING OR SHORTNESS OF BREATH.                    3. Red - Stop! Danger! <50% Personal Best Peak Flow  Continue Controller  Medications But ADD:   Medicine not helping  Breathing is hard and fast  Nose opens wide  Can't walk  Ribs show  Can't talk well Medicine How much to take When to take it    If your symptoms do not improve in ONE hour -  go to the emergency room or call 911 immediately! If symptoms improve, call office for appointment immediately.    Albuterol inhaler 2 puffs every 20 minutes for three treatments       Don't forget:  Rinse mouth after using inhaler  Use spacer for inhaler  Remember to get your Flu vaccine every fall!    [x] Asthma Action Plan reviewed with the caregiver and patient, and a copy of the plan was given to the patient/caregiver.   [] Asthma Action Plan reviewed with the caregiver and patient on the phone, and copy mailed to patient/caregiver or sent via [employer].     Signatures:   Provider  Ramonita Stovall DO Patient  Brandin S Ico Caretaker

## (undated) NOTE — LETTER
ASTHMA ACTION PLAN for Brandin Curry     : 3/13/1965     Date: 24  Doctor:  Unruly Santamaria MD  Phone for doctor or clinic: [facility], [address] [place] [id number] [phone]      ACT Score: 24    ACT Goal: 20 or greater    Call your provider if you require your rescue/quick reliever medication more than 2-3 times in a 24 hour period.    If you require your rescue inhaler/medication more than 2-3 times weekly, your asthma may not be under proper control and you should seek medical attention.    *Quick Relievers are Xopenex and Albuterol*    You can use the colors of a traffic light to help learn about your asthma medicines.  Therapy Range       1. Green - Go! % of Personal Best Peak Flow   Use controller medicine.   Breathing is good  No cough or wheeze  Can work and play Medicine How much to take When to take it    Medications       Leukotriene Modulators Instructions     montelukast 10 MG Oral Tab Take 1 tablet (10 mg total) by mouth daily.       Sympathomimetics Instructions     albuterol 108 (90 Base) MCG/ACT Inhalation Aero Soln INHALE 2 PUFFS BY MOUTH EVERY 4 TO 6 HOURS AS NEEDED FOR WHEEZING OR SHORTNESS OF BREATH.     ALBUTEROL 108 (90 Base) MCG/ACT Inhalation Aero Soln (Discontinued) INHALE 2 PUFFS BY MOUTH EVERY 4 TO 6 HOURS AS NEEDED FOR WHEEZING OR SHORTNESS OF BREATH.                    2. Yellow - Caution. 50-79% Personal Best Peak Flow  Use reliever medicine to keep an asthma attack from getting bad.   Cough  Quick Relievers  Wheezing  Tight Chest  Wake up at night Medicine How much to take When to take it    If symptoms are not improving in 24-48 hrs, call office for further instructions  Medications       Leukotriene Modulators Instructions     montelukast 10 MG Oral Tab Take 1 tablet (10 mg total) by mouth daily.       Sympathomimetics Instructions     albuterol 108 (90 Base) MCG/ACT Inhalation Aero Soln INHALE 2 PUFFS BY  MOUTH EVERY 4 TO 6 HOURS AS NEEDED FOR WHEEZING OR SHORTNESS OF BREATH.     ALBUTEROL 108 (90 Base) MCG/ACT Inhalation Aero Soln (Discontinued) INHALE 2 PUFFS BY MOUTH EVERY 4 TO 6 HOURS AS NEEDED FOR WHEEZING OR SHORTNESS OF BREATH.                    3. Red - Stop! Danger! <50% Personal Best Peak Flow  Continue Controller Medications But ADD:   Medicine not helping  Breathing is hard and fast  Nose opens wide  Can't walk  Ribs show  Can't talk well Medicine How much to take When to take it    If your symptoms do not improve in ONE hour -  go to the emergency room or call 911 immediately! If symptoms improve, call office for appointment immediately.    Albuterol inhaler 2 puffs every 20 minutes for three treatments       Don't forget:  Rinse mouth after using inhaler  Use spacer for inhaler  Remember to get your Flu vaccine every fall!    [x] Asthma Action Plan reviewed with the caregiver and patient, and a copy of the plan was given to the patient/caregiver.   [] Asthma Action Plan reviewed with the caregiver and patient on the phone, and copy mailed to patient/caregiver or sent via [email or web address].     Signatures:   Provider  Unruly Santamaria MD Patient  Brandin MARIO Curry Caretaker